# Patient Record
Sex: MALE | HISPANIC OR LATINO | Employment: OTHER | URBAN - METROPOLITAN AREA
[De-identification: names, ages, dates, MRNs, and addresses within clinical notes are randomized per-mention and may not be internally consistent; named-entity substitution may affect disease eponyms.]

---

## 2022-01-01 ENCOUNTER — MEDICAL CORRESPONDENCE (OUTPATIENT)
Dept: HEALTH INFORMATION MANAGEMENT | Facility: CLINIC | Age: 67
End: 2022-01-01

## 2022-01-01 ENCOUNTER — TRANSCRIBE ORDERS (OUTPATIENT)
Dept: OTHER | Age: 67
End: 2022-01-01

## 2022-01-01 ENCOUNTER — OFFICE VISIT (OUTPATIENT)
Dept: OPHTHALMOLOGY | Facility: CLINIC | Age: 67
End: 2022-01-01
Attending: OPHTHALMOLOGY
Payer: MEDICARE

## 2022-01-01 ENCOUNTER — NURSE TRIAGE (OUTPATIENT)
Dept: NURSING | Facility: CLINIC | Age: 67
End: 2022-01-01

## 2022-01-01 ENCOUNTER — TELEPHONE (OUTPATIENT)
Dept: OPHTHALMOLOGY | Facility: CLINIC | Age: 67
End: 2022-01-01

## 2022-01-01 DIAGNOSIS — H43.21 ASTEROID HYALOSIS OF RIGHT EYE: ICD-10-CM

## 2022-01-01 DIAGNOSIS — H02.886 MEIBOMIAN GLAND DYSFUNCTION (MGD) OF BOTH EYES: ICD-10-CM

## 2022-01-01 DIAGNOSIS — H04.123 DRY EYES, BILATERAL: ICD-10-CM

## 2022-01-01 DIAGNOSIS — H25.813 COMBINED FORMS OF AGE-RELATED CATARACT OF BOTH EYES: ICD-10-CM

## 2022-01-01 DIAGNOSIS — H02.883 MEIBOMIAN GLAND DYSFUNCTION (MGD) OF BOTH EYES: ICD-10-CM

## 2022-01-01 DIAGNOSIS — H53.8 BLURRY VISION, LEFT EYE: Primary | ICD-10-CM

## 2022-01-01 DIAGNOSIS — H40.003 GLAUCOMA SUSPECT OF BOTH EYES: Primary | ICD-10-CM

## 2022-01-01 PROCEDURE — 92133 CPTRZD OPH DX IMG PST SGM ON: CPT | Performed by: OPHTHALMOLOGY

## 2022-01-01 PROCEDURE — 92004 COMPRE OPH EXAM NEW PT 1/>: CPT | Mod: GC | Performed by: OPHTHALMOLOGY

## 2022-01-01 PROCEDURE — G0463 HOSPITAL OUTPT CLINIC VISIT: HCPCS

## 2022-01-01 RX ORDER — CARBOXYMETHYLCELLULOSE SODIUM 10 MG/ML
SOLUTION/ DROPS OPHTHALMIC
COMMUNITY
Start: 2022-01-01

## 2022-01-01 RX ORDER — TAMSULOSIN HYDROCHLORIDE 0.4 MG/1
0.4 CAPSULE ORAL DAILY
COMMUNITY
Start: 2022-01-01

## 2022-01-01 ASSESSMENT — REFRACTION_WEARINGRX
OD_SPHERE: +3.00
OS_CYLINDER: +1.00
OD_AXIS: 133
OD_ADD: +3.25
OS_SPHERE: +3.00
OS_ADD: +3.25
OS_AXIS: 058
OD_CYLINDER: +0.75

## 2022-01-01 ASSESSMENT — TONOMETRY
IOP_METHOD: TONOPEN
OD_IOP_MMHG: 18
OS_IOP_MMHG: 15

## 2022-01-01 ASSESSMENT — VISUAL ACUITY
OD_CC+: +2
OD_PH_CC: 20/25
OS_PH_CC: 20/25
OS_CC: 20/40
METHOD: SNELLEN - LINEAR
OD_PH_CC+: +2
OS_CC+: -2
OD_CC: 20/30
CORRECTION_TYPE: GLASSES
OS_PH_CC+: -1

## 2022-01-01 ASSESSMENT — CONF VISUAL FIELD
OS_SUPERIOR_NASAL_RESTRICTION: 0
METHOD: COUNTING FINGERS
OS_SUPERIOR_TEMPORAL_RESTRICTION: 0
OD_SUPERIOR_TEMPORAL_RESTRICTION: 0
OS_INFERIOR_NASAL_RESTRICTION: 0
OD_NORMAL: 1
OS_NORMAL: 1
OD_INFERIOR_TEMPORAL_RESTRICTION: 0
OD_INFERIOR_NASAL_RESTRICTION: 0
OD_SUPERIOR_NASAL_RESTRICTION: 0
OS_INFERIOR_TEMPORAL_RESTRICTION: 0

## 2022-01-01 ASSESSMENT — EXTERNAL EXAM - LEFT EYE: OS_EXAM: BROW PTOSIS

## 2022-01-01 ASSESSMENT — SLIT LAMP EXAM - LIDS
COMMENTS: DERMATOCHALASIS WITH LATERAL HOODING
COMMENTS: DERMATOCHALASIS

## 2022-01-01 ASSESSMENT — CUP TO DISC RATIO
OS_RATIO: .75
OD_RATIO: .75

## 2022-01-01 ASSESSMENT — EXTERNAL EXAM - RIGHT EYE: OD_EXAM: NORMAL

## 2022-12-06 NOTE — TELEPHONE ENCOUNTER
Spoke to pt with     Pt seen recently for eye pain on 12-4-2022 and spot in vision longer than 1 Month.    No acute vision changes    Foreign body sensation persist ant but improved since 12-4-2022    Scheduled tomorrow with Dr. Coffey at 0945    Pt aware of date/kyler/location/duration.    Juan Riddle RN 2:27 PM 12/06/22

## 2022-12-06 NOTE — TELEPHONE ENCOUNTER
Nurse Triage SBAR    Is this a 2nd Level Triage?    Yes    Situation: Left eye issue/Right eye has a black spot in the eye that has been there for years.       Background/Assessment:     Pt reporting, he went to a [place] eye clinic for a particle of something in his left eye.   Blurred vision in the left eye.    This provider gave him some wraps to help with the blurred vision.    But still feels like there is something stuck in his eye.    Also reporting, there is a black spot in the right eye.  But it has been there for years.    Pt wanting to be seen again in the eye clinic.    Called the back line to the clinic and transferred the call for further assistance.    Melissa Russ RN  Central Triage Red Flags/Med Refills         Protocol Recommended Disposition:   See in Office Today      Reason for Disposition    Patient wants to be seen    Additional Information    Negative: Doesn't sound like FB in the eye    Negative: Foreign body is a chemical    Negative: Foreign body (FB) hit eye at high speed (e.g., small metallic chip from hammering, lawnmower, BB gun, explosion)    Negative: Foreign body (FB) stuck on eyeball    Negative: Sharp FB (even if FB was removed) and any pain present now    Negative: Eye has been washed out > 30 minutes ago and still feels like FB is still present    Negative: Blurred vision that persists >1 hour after irrigation (regardless of duration of flushing)    Negative: Eye pain that persists > 1 hour after irrigation (regardless of duration of flushing)    Negative: Tearing or blinking that persists > 1 hour since irrigation (regardless of duration of flushing)    Negative: Cloudy spot on the cornea (clear part of the eye)    Negative: Sounds like a serious injury to the triager    Negative: SEVERE eye pain    Negative: Yellow or green pus occurs    Negative: Contact lens stuck in eye and unable to remove using CARE ADVICE    Protocols used: EYE - FOREIGN BODY-A-OH

## 2022-12-06 NOTE — TELEPHONE ENCOUNTER
Pt scheduled tomorrow with Dr. Coffey.    documented in other telephone encounter fro today    Juan Riddle RN 2:28 PM 12/06/22

## 2022-12-07 NOTE — PROGRESS NOTES
HPI    We are using the help of a  for this encounter.   Pt says that his vision is blurry.  There are dark spots in his RE.  Pt states that the floaters follow his vision. They are most noticeable in the mornings.   Pt has been using artificial tears. Pt states that the drops have been helping his eye.   Last week pt felt like there was something in his LE, but there is no pain currently.     MARCO ZUÑIGA COA December 7, 2022 10:15 AM     Last edited by MARCO ZUÑIGA on 12/7/2022 10:16 AM.         Review of systems for the eyes was negative other than the pertinent positives/negatives listed in the HPI.      Assessment & Plan    HPI:  Kristian HILL is a 67 year old male who reports 2 months of blurry vision in his left eye. He reports that 2 months ago he was sanding wood and felt something enter his left eye. Since then he has had persistent blurry vision.    He also reports floaters in the right eye for years that move with his eye.    POHx: bifocal use  PMHx:  History reviewed. No pertinent past medical history.    Current Medications: omeprazole (PRILOSEC) 20 MG DR capsule, TAKE 1 CAPSULE BY MOUTH EVERY DAY **DO NOT CRUSH**  ARTIFICIAL TEARS 1 % ophthalmic solution, APPLY 1 DROP TO EYE 4 (FOUR) TIMES DAILY AS NEEDED (FOR DRY EYES) FOR UP TO 90 DAYS  tamsulosin (FLOMAX) 0.4 MG capsule,     No current facility-administered medications on file prior to visit.    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 No treatment currently recommended. The patient will monitor for vision changes and contact us with any decrease in vision. Recheck in one year.      (H04.123) Dry eyes, bilateral  (H02.883,  H02.886) Meibomian gland dysfunction (MGD) of both eyes  Comment: Already using AT  Plan: Continue using AT, warm compresses as needed    Refraction error  Refraction recently updated at outside facility and patient is getting new glasses from them. Refraction deferred today per patient's request    Return in 2 months (on 2/7/2023) for HVF 24-2, applanation, pachymetry, and gonioscopy UNDILATED.        Thank you for entrusting us with your care  Kelli Arriaga MD, PGY2  Ophthalmology Resident  [facility]      Attending Physician Attestation:  Complete documentation of historical and exam elements from today's encounter can be found in the full encounter summary report (not reduplicated in this progress note).  I personally obtained the chief complaint(s) and history of present illness.  I confirmed and edited as necessary the review of systems, past medical/surgical history, family history, social history, and examination findings as documented by others; and I examined the patient myself.  I personally reviewed the relevant tests, images, and reports as documented above.  I formulated and edited as necessary the assessment and plan and discussed the findings and management plan with the patient and family. - Lawrence Coffey MD

## 2022-12-07 NOTE — NURSING NOTE
Chief Complaints and History of Present Illnesses   Patient presents with     Floaters     Chief Complaint(s) and History of Present Illness(es)     Floaters           Comments    We are using the help of a  for this encounter.   Pt says that his vision is blurry.  There are dark spots in his RE.  Pt states that the floaters follow his vision. They are most noticeable in the mornings.   Pt has been using artificial tears. Pt states that the drops have been helping his eye.   Last week pt felt like there was something in his LE, but there is no pain currently.     MARCO ZUÑIGA COA December 7, 2022 10:15 AM

## 2023-01-01 ENCOUNTER — TELEPHONE (OUTPATIENT)
Dept: OPHTHALMOLOGY | Facility: CLINIC | Age: 68
End: 2023-01-01

## 2023-01-01 ENCOUNTER — APPOINTMENT (OUTPATIENT)
Dept: INTERPRETER SERVICES | Facility: CLINIC | Age: 68
End: 2023-01-01
Payer: MEDICARE

## 2023-01-01 ENCOUNTER — OFFICE VISIT (OUTPATIENT)
Dept: SURGERY | Facility: CLINIC | Age: 68
End: 2023-01-01
Payer: MEDICARE

## 2023-01-01 ENCOUNTER — LAB REQUISITION (OUTPATIENT)
Dept: LAB | Facility: CLINIC | Age: 68
End: 2023-01-01
Payer: MEDICARE

## 2023-01-01 ENCOUNTER — TRANSCRIBE ORDERS (OUTPATIENT)
Dept: OTHER | Age: 68
End: 2023-01-01

## 2023-01-01 ENCOUNTER — HOSPITAL ENCOUNTER (OUTPATIENT)
Facility: AMBULATORY SURGERY CENTER | Age: 68
Discharge: HOME | End: 2023-11-02
Attending: OPHTHALMOLOGY
Payer: MEDICARE

## 2023-01-01 ENCOUNTER — ANESTHESIA EVENT (OUTPATIENT)
Dept: SURGERY | Facility: AMBULATORY SURGERY CENTER | Age: 68
End: 2023-01-01
Payer: MEDICARE

## 2023-01-01 ENCOUNTER — PATIENT OUTREACH (OUTPATIENT)
Dept: DERMATOLOGY | Facility: CLINIC | Age: 68
End: 2023-01-01
Payer: MEDICARE

## 2023-01-01 ENCOUNTER — LAB (OUTPATIENT)
Dept: LAB | Facility: CLINIC | Age: 68
End: 2023-01-01
Attending: INTERNAL MEDICINE
Payer: MEDICARE

## 2023-01-01 ENCOUNTER — HOSPITAL ENCOUNTER (OUTPATIENT)
Facility: CLINIC | Age: 68
Discharge: HOME | End: 2023-06-19
Attending: INTERNAL MEDICINE | Admitting: INTERNAL MEDICINE
Payer: MEDICARE

## 2023-01-01 ENCOUNTER — OFFICE VISIT (OUTPATIENT)
Dept: OPHTHALMOLOGY | Facility: CLINIC | Age: 68
End: 2023-01-01
Attending: OPHTHALMOLOGY
Payer: MEDICARE

## 2023-01-01 ENCOUNTER — TELEPHONE (OUTPATIENT)
Dept: GASTROENTEROLOGY | Facility: CLINIC | Age: 68
End: 2023-01-01
Payer: MEDICARE

## 2023-01-01 ENCOUNTER — OFFICE VISIT (OUTPATIENT)
Dept: OPHTHALMOLOGY | Facility: CLINIC | Age: 68
End: 2023-01-01
Payer: MEDICARE

## 2023-01-01 ENCOUNTER — PRE VISIT (OUTPATIENT)
Dept: SURGERY | Facility: CLINIC | Age: 68
End: 2023-01-01

## 2023-01-01 ENCOUNTER — ANESTHESIA (OUTPATIENT)
Dept: SURGERY | Facility: AMBULATORY SURGERY CENTER | Age: 68
End: 2023-01-01
Payer: MEDICARE

## 2023-01-01 ENCOUNTER — HOSPITAL ENCOUNTER (OUTPATIENT)
Facility: AMBULATORY SURGERY CENTER | Age: 68
Discharge: HOME | End: 2023-08-31
Attending: OPHTHALMOLOGY
Payer: MEDICARE

## 2023-01-01 ENCOUNTER — TRANSFERRED RECORDS (OUTPATIENT)
Dept: HEALTH INFORMATION MANAGEMENT | Facility: CLINIC | Age: 68
End: 2023-01-01

## 2023-01-01 ENCOUNTER — ANCILLARY PROCEDURE (OUTPATIENT)
Dept: ULTRASOUND IMAGING | Facility: CLINIC | Age: 68
End: 2023-01-01
Attending: INTERNAL MEDICINE
Payer: MEDICARE

## 2023-01-01 ENCOUNTER — TELEPHONE (OUTPATIENT)
Dept: DERMATOLOGY | Facility: CLINIC | Age: 68
End: 2023-01-01

## 2023-01-01 ENCOUNTER — MEDICAL CORRESPONDENCE (OUTPATIENT)
Dept: HEALTH INFORMATION MANAGEMENT | Facility: CLINIC | Age: 68
End: 2023-01-01
Payer: MEDICARE

## 2023-01-01 ENCOUNTER — MEDICAL CORRESPONDENCE (OUTPATIENT)
Dept: HEALTH INFORMATION MANAGEMENT | Facility: CLINIC | Age: 68
End: 2023-01-01

## 2023-01-01 ENCOUNTER — OFFICE VISIT (OUTPATIENT)
Dept: DERMATOLOGY | Facility: CLINIC | Age: 68
End: 2023-01-01
Attending: INTERNAL MEDICINE
Payer: MEDICARE

## 2023-01-01 ENCOUNTER — TELEPHONE (OUTPATIENT)
Dept: OPHTHALMOLOGY | Facility: CLINIC | Age: 68
End: 2023-01-01
Payer: MEDICARE

## 2023-01-01 ENCOUNTER — OFFICE VISIT (OUTPATIENT)
Dept: OPHTHALMOLOGY | Facility: CLINIC | Age: 68
End: 2023-01-01

## 2023-01-01 ENCOUNTER — TELEPHONE (OUTPATIENT)
Dept: GASTROENTEROLOGY | Facility: CLINIC | Age: 68
End: 2023-01-01

## 2023-01-01 ENCOUNTER — PATIENT OUTREACH (OUTPATIENT)
Dept: CARE COORDINATION | Facility: CLINIC | Age: 68
End: 2023-01-01
Payer: MEDICARE

## 2023-01-01 ENCOUNTER — PATIENT OUTREACH (OUTPATIENT)
Dept: CARE COORDINATION | Facility: CLINIC | Age: 68
End: 2023-01-01

## 2023-01-01 ENCOUNTER — OFFICE VISIT (OUTPATIENT)
Dept: GASTROENTEROLOGY | Facility: CLINIC | Age: 68
End: 2023-01-01
Attending: INTERNAL MEDICINE
Payer: MEDICARE

## 2023-01-01 VITALS
HEART RATE: 56 BPM | DIASTOLIC BLOOD PRESSURE: 83 MMHG | SYSTOLIC BLOOD PRESSURE: 126 MMHG | RESPIRATION RATE: 14 BRPM | OXYGEN SATURATION: 98 %

## 2023-01-01 VITALS
BODY MASS INDEX: 32.78 KG/M2 | OXYGEN SATURATION: 97 % | DIASTOLIC BLOOD PRESSURE: 71 MMHG | HEART RATE: 56 BPM | TEMPERATURE: 98.4 F | HEIGHT: 63 IN | SYSTOLIC BLOOD PRESSURE: 132 MMHG | WEIGHT: 185 LBS

## 2023-01-01 VITALS
DIASTOLIC BLOOD PRESSURE: 64 MMHG | WEIGHT: 180 LBS | BODY MASS INDEX: 31.89 KG/M2 | RESPIRATION RATE: 14 BRPM | HEIGHT: 63 IN | TEMPERATURE: 96.9 F | SYSTOLIC BLOOD PRESSURE: 113 MMHG | OXYGEN SATURATION: 99 % | HEART RATE: 51 BPM

## 2023-01-01 VITALS
DIASTOLIC BLOOD PRESSURE: 82 MMHG | BODY MASS INDEX: 31.89 KG/M2 | OXYGEN SATURATION: 99 % | HEIGHT: 63 IN | TEMPERATURE: 97.2 F | WEIGHT: 180 LBS | RESPIRATION RATE: 20 BRPM | SYSTOLIC BLOOD PRESSURE: 132 MMHG

## 2023-01-01 VITALS
WEIGHT: 189.5 LBS | HEART RATE: 71 BPM | DIASTOLIC BLOOD PRESSURE: 87 MMHG | HEIGHT: 63 IN | SYSTOLIC BLOOD PRESSURE: 146 MMHG | OXYGEN SATURATION: 99 % | BODY MASS INDEX: 33.58 KG/M2

## 2023-01-01 DIAGNOSIS — L21.9 DERMATITIS, SEBORRHEIC: Primary | ICD-10-CM

## 2023-01-01 DIAGNOSIS — H25.813 COMBINED FORMS OF AGE-RELATED CATARACT OF BOTH EYES: ICD-10-CM

## 2023-01-01 DIAGNOSIS — B18.2 CHRONIC HEPATITIS C WITHOUT HEPATIC COMA (H): Primary | ICD-10-CM

## 2023-01-01 DIAGNOSIS — H54.7 UNEXPLAINED VISUAL LOSS: Primary | ICD-10-CM

## 2023-01-01 DIAGNOSIS — L29.9 PRURITUS: ICD-10-CM

## 2023-01-01 DIAGNOSIS — Z96.1 PSEUDOPHAKIA, LEFT EYE: Primary | ICD-10-CM

## 2023-01-01 DIAGNOSIS — H52.03 HYPEROPIA OF BOTH EYES WITH ASTIGMATISM AND PRESBYOPIA: ICD-10-CM

## 2023-01-01 DIAGNOSIS — H25.811 COMBINED FORMS OF AGE-RELATED CATARACT OF RIGHT EYE: Primary | ICD-10-CM

## 2023-01-01 DIAGNOSIS — Z01.818 PRE-OP EVALUATION: Primary | ICD-10-CM

## 2023-01-01 DIAGNOSIS — R10.13 EPIGASTRIC PAIN: ICD-10-CM

## 2023-01-01 DIAGNOSIS — L98.9 SCALP LESION: ICD-10-CM

## 2023-01-01 DIAGNOSIS — Z00.00 ENCOUNTER FOR GENERAL ADULT MEDICAL EXAMINATION WITHOUT ABNORMAL FINDINGS: ICD-10-CM

## 2023-01-01 DIAGNOSIS — Z86.0100 HISTORY OF COLONIC POLYPS: Primary | ICD-10-CM

## 2023-01-01 DIAGNOSIS — Z71.89 COUNSELING AND COORDINATION OF CARE: Primary | ICD-10-CM

## 2023-01-01 DIAGNOSIS — B18.2 CHRONIC VIRAL HEPATITIS C (H): ICD-10-CM

## 2023-01-01 DIAGNOSIS — B18.2 CHRONIC HEPATITIS C WITHOUT HEPATIC COMA (H): ICD-10-CM

## 2023-01-01 DIAGNOSIS — D48.5 NEOPLASM OF UNCERTAIN BEHAVIOR OF SKIN: ICD-10-CM

## 2023-01-01 DIAGNOSIS — R63.8 OTHER SYMPTOMS AND SIGNS CONCERNING FOOD AND FLUID INTAKE: ICD-10-CM

## 2023-01-01 DIAGNOSIS — Z98.890 POSTOPERATIVE EYE STATE: Primary | ICD-10-CM

## 2023-01-01 DIAGNOSIS — H40.003 GLAUCOMA SUSPECT OF BOTH EYES: Primary | ICD-10-CM

## 2023-01-01 DIAGNOSIS — H40.003 GLAUCOMA SUSPECT OF BOTH EYES: ICD-10-CM

## 2023-01-01 DIAGNOSIS — Z12.11 SCREENING FOR COLON CANCER: Primary | ICD-10-CM

## 2023-01-01 DIAGNOSIS — Z96.1 PSEUDOPHAKIA, LEFT EYE: ICD-10-CM

## 2023-01-01 DIAGNOSIS — R39.9 UNSPECIFIED SYMPTOMS AND SIGNS INVOLVING THE GENITOURINARY SYSTEM: ICD-10-CM

## 2023-01-01 DIAGNOSIS — Z96.1 PSEUDOPHAKIA OF BOTH EYES: Primary | ICD-10-CM

## 2023-01-01 DIAGNOSIS — R74.8 ELEVATED LIVER ENZYMES: ICD-10-CM

## 2023-01-01 DIAGNOSIS — H25.812 COMBINED FORM OF AGE-RELATED CATARACT, LEFT EYE: Primary | ICD-10-CM

## 2023-01-01 DIAGNOSIS — Z96.1 PSEUDOPHAKIA OF RIGHT EYE: ICD-10-CM

## 2023-01-01 DIAGNOSIS — I10 ESSENTIAL (PRIMARY) HYPERTENSION: ICD-10-CM

## 2023-01-01 DIAGNOSIS — H52.4 HYPEROPIA OF BOTH EYES WITH ASTIGMATISM AND PRESBYOPIA: ICD-10-CM

## 2023-01-01 DIAGNOSIS — L98.9 SCALP LESION: Primary | ICD-10-CM

## 2023-01-01 DIAGNOSIS — N40.1 BENIGN PROSTATIC HYPERPLASIA WITH LOWER URINARY TRACT SYMPTOMS: ICD-10-CM

## 2023-01-01 DIAGNOSIS — H52.203 HYPEROPIA OF BOTH EYES WITH ASTIGMATISM AND PRESBYOPIA: ICD-10-CM

## 2023-01-01 LAB
ALBUMIN SERPL BCG-MCNC: 4.3 G/DL (ref 3.5–5.2)
ALBUMIN SERPL BCG-MCNC: 4.3 G/DL (ref 3.5–5.2)
ALP SERPL-CCNC: 61 U/L (ref 40–150)
ALP SERPL-CCNC: 80 U/L (ref 40–129)
ALT SERPL W P-5'-P-CCNC: 121 U/L (ref 10–50)
ALT SERPL W P-5'-P-CCNC: 86 U/L (ref 0–70)
ANION GAP SERPL CALCULATED.3IONS-SCNC: 12 MMOL/L (ref 7–15)
ANION GAP SERPL CALCULATED.3IONS-SCNC: 15 MMOL/L (ref 7–15)
ANION GAP SERPL CALCULATED.3IONS-SCNC: 18 MMOL/L (ref 7–15)
AST SERPL W P-5'-P-CCNC: 51 U/L (ref 0–45)
AST SERPL W P-5'-P-CCNC: 66 U/L (ref 10–50)
BILIRUB SERPL-MCNC: 0.7 MG/DL
BILIRUB SERPL-MCNC: 0.9 MG/DL
BUN SERPL-MCNC: 11.3 MG/DL (ref 8–23)
BUN SERPL-MCNC: 15.4 MG/DL (ref 8–23)
BUN SERPL-MCNC: 17.3 MG/DL (ref 8–23)
CALCIUM SERPL-MCNC: 9 MG/DL (ref 8.8–10.2)
CALCIUM SERPL-MCNC: 9.5 MG/DL (ref 8.8–10.2)
CALCIUM SERPL-MCNC: 9.7 MG/DL (ref 8.8–10.2)
CHLORIDE SERPL-SCNC: 102 MMOL/L (ref 98–107)
CHLORIDE SERPL-SCNC: 103 MMOL/L (ref 98–107)
CHLORIDE SERPL-SCNC: 108 MMOL/L (ref 98–107)
CHOLEST SERPL-MCNC: 157 MG/DL
COLONOSCOPY: NORMAL
CREAT SERPL-MCNC: 0.73 MG/DL (ref 0.67–1.17)
CREAT SERPL-MCNC: 0.82 MG/DL (ref 0.67–1.17)
CREAT SERPL-MCNC: 0.83 MG/DL (ref 0.67–1.17)
DEPRECATED HCO3 PLAS-SCNC: 20 MMOL/L (ref 22–29)
DEPRECATED HCO3 PLAS-SCNC: 22 MMOL/L (ref 22–29)
DEPRECATED HCO3 PLAS-SCNC: 23 MMOL/L (ref 22–29)
EGFRCR SERPLBLD CKD-EPI 2021: >90 ML/MIN/1.73M2
ERYTHROCYTE [DISTWIDTH] IN BLOOD BY AUTOMATED COUNT: 12.3 % (ref 10–15)
GFR SERPL CREATININE-BSD FRML MDRD: >90 ML/MIN/1.73M2
GFR SERPL CREATININE-BSD FRML MDRD: >90 ML/MIN/1.73M2
GLUCOSE SERPL-MCNC: 100 MG/DL (ref 70–99)
GLUCOSE SERPL-MCNC: 101 MG/DL (ref 70–99)
GLUCOSE SERPL-MCNC: 90 MG/DL (ref 70–99)
H PYLORI AG STL QL IA: NEGATIVE
HBV CORE AB SERPL QL IA: NONREACTIVE
HBV SURFACE AB SERPL IA-ACNC: 1.25 M[IU]/ML
HBV SURFACE AB SERPL IA-ACNC: NONREACTIVE M[IU]/ML
HBV SURFACE AG SERPL QL IA: NONREACTIVE
HCT VFR BLD AUTO: 49.8 % (ref 40–53)
HCV AB SERPL QL IA: REACTIVE
HCV GENTYP SERPL NAA+PROBE: NORMAL
HCV RNA SERPL NAA+PROBE-ACNC: ABNORMAL IU/ML
HCV RNA SERPL NAA+PROBE-ACNC: ABNORMAL IU/ML
HCV RNA SERPL NAA+PROBE-LOG IU: 4.8 {LOG_IU}/ML
HCV RNA SERPL NAA+PROBE-LOG IU: 5.8 {LOG_IU}/ML
HDLC SERPL-MCNC: 46 MG/DL
HGB BLD-MCNC: 16.9 G/DL (ref 13.3–17.7)
INR PPP: 1.01 (ref 0.85–1.15)
LDLC SERPL CALC-MCNC: 84 MG/DL
MCH RBC QN AUTO: 29.9 PG (ref 26.5–33)
MCHC RBC AUTO-ENTMCNC: 33.9 G/DL (ref 31.5–36.5)
MCV RBC AUTO: 88 FL (ref 78–100)
NONHDLC SERPL-MCNC: 111 MG/DL
PATH REPORT.COMMENTS IMP SPEC: NORMAL
PATH REPORT.FINAL DX SPEC: NORMAL
PATH REPORT.FINAL DX SPEC: NORMAL
PATH REPORT.GROSS SPEC: NORMAL
PATH REPORT.GROSS SPEC: NORMAL
PATH REPORT.MICROSCOPIC SPEC OTHER STN: NORMAL
PATH REPORT.MICROSCOPIC SPEC OTHER STN: NORMAL
PATH REPORT.RELEVANT HX SPEC: NORMAL
PATH REPORT.RELEVANT HX SPEC: NORMAL
PHOTO IMAGE: NORMAL
PLATELET # BLD AUTO: 164 10E3/UL (ref 150–450)
POTASSIUM SERPL-SCNC: 3.4 MMOL/L (ref 3.4–5.3)
POTASSIUM SERPL-SCNC: 3.8 MMOL/L (ref 3.4–5.3)
POTASSIUM SERPL-SCNC: 3.8 MMOL/L (ref 3.4–5.3)
PROT SERPL-MCNC: 8.2 G/DL (ref 6.4–8.3)
PROT SERPL-MCNC: 8.4 G/DL (ref 6.4–8.3)
PSA SERPL-MCNC: 3.49 NG/ML (ref 0–4.5)
RBC # BLD AUTO: 5.66 10E6/UL (ref 4.4–5.9)
SODIUM SERPL-SCNC: 140 MMOL/L (ref 136–145)
SODIUM SERPL-SCNC: 140 MMOL/L (ref 136–145)
SODIUM SERPL-SCNC: 143 MMOL/L (ref 135–145)
TRIGL SERPL-MCNC: 137 MG/DL
TSH SERPL DL<=0.005 MIU/L-ACNC: 3.3 UIU/ML (ref 0.3–4.2)
WBC # BLD AUTO: 4.3 10E3/UL (ref 4–11)

## 2023-01-01 PROCEDURE — 92083 EXTENDED VISUAL FIELD XM: CPT | Performed by: OPHTHALMOLOGY

## 2023-01-01 PROCEDURE — G0463 HOSPITAL OUTPT CLINIC VISIT: HCPCS | Performed by: OPHTHALMOLOGY

## 2023-01-01 PROCEDURE — 99203 OFFICE O/P NEW LOW 30 MIN: CPT | Mod: 25 | Performed by: STUDENT IN AN ORGANIZED HEALTH CARE EDUCATION/TRAINING PROGRAM

## 2023-01-01 PROCEDURE — 80048 BASIC METABOLIC PNL TOTAL CA: CPT | Mod: ORL | Performed by: INTERNAL MEDICINE

## 2023-01-01 PROCEDURE — 99024 POSTOP FOLLOW-UP VISIT: CPT | Mod: GC | Performed by: OPHTHALMOLOGY

## 2023-01-01 PROCEDURE — 99214 OFFICE O/P EST MOD 30 MIN: CPT | Performed by: OPHTHALMOLOGY

## 2023-01-01 PROCEDURE — 99204 OFFICE O/P NEW MOD 45 MIN: CPT | Performed by: INTERNAL MEDICINE

## 2023-01-01 PROCEDURE — 66984 XCAPSL CTRC RMVL W/O ECP: CPT | Mod: 79 | Performed by: OPHTHALMOLOGY

## 2023-01-01 PROCEDURE — 84443 ASSAY THYROID STIM HORMONE: CPT | Mod: ORL | Performed by: INTERNAL MEDICINE

## 2023-01-01 PROCEDURE — 88305 TISSUE EXAM BY PATHOLOGIST: CPT | Mod: TC | Performed by: STUDENT IN AN ORGANIZED HEALTH CARE EDUCATION/TRAINING PROGRAM

## 2023-01-01 PROCEDURE — 87338 HPYLORI STOOL AG IA: CPT | Mod: ORL | Performed by: INTERNAL MEDICINE

## 2023-01-01 PROCEDURE — 999N000099 HC STATISTIC MODERATE SEDATION < 10 MIN: Performed by: INTERNAL MEDICINE

## 2023-01-01 PROCEDURE — G0103 PSA SCREENING: HCPCS | Mod: ORL | Performed by: INTERNAL MEDICINE

## 2023-01-01 PROCEDURE — 87340 HEPATITIS B SURFACE AG IA: CPT | Mod: ORL | Performed by: INTERNAL MEDICINE

## 2023-01-01 PROCEDURE — 99024 POSTOP FOLLOW-UP VISIT: CPT | Performed by: OPHTHALMOLOGY

## 2023-01-01 PROCEDURE — 11102 TANGNTL BX SKIN SINGLE LES: CPT | Mod: GC | Performed by: STUDENT IN AN ORGANIZED HEALTH CARE EDUCATION/TRAINING PROGRAM

## 2023-01-01 PROCEDURE — 87522 HEPATITIS C REVRS TRNSCRPJ: CPT | Mod: ORL | Performed by: INTERNAL MEDICINE

## 2023-01-01 PROCEDURE — 85610 PROTHROMBIN TIME: CPT | Mod: ORL | Performed by: INTERNAL MEDICINE

## 2023-01-01 PROCEDURE — 88305 TISSUE EXAM BY PATHOLOGIST: CPT | Mod: 26 | Performed by: STUDENT IN AN ORGANIZED HEALTH CARE EDUCATION/TRAINING PROGRAM

## 2023-01-01 PROCEDURE — 76516 ECHO EXAM OF EYE: CPT | Performed by: OPHTHALMOLOGY

## 2023-01-01 PROCEDURE — G0500 MOD SEDAT ENDO SERVICE >5YRS: HCPCS | Performed by: INTERNAL MEDICINE

## 2023-01-01 PROCEDURE — 45385 COLONOSCOPY W/LESION REMOVAL: CPT | Mod: PT | Performed by: INTERNAL MEDICINE

## 2023-01-01 PROCEDURE — 76700 US EXAM ABDOM COMPLETE: CPT | Performed by: STUDENT IN AN ORGANIZED HEALTH CARE EDUCATION/TRAINING PROGRAM

## 2023-01-01 PROCEDURE — 85027 COMPLETE CBC AUTOMATED: CPT | Performed by: PATHOLOGY

## 2023-01-01 PROCEDURE — 86706 HEP B SURFACE ANTIBODY: CPT | Mod: ORL | Performed by: INTERNAL MEDICINE

## 2023-01-01 PROCEDURE — 86803 HEPATITIS C AB TEST: CPT | Mod: ORL | Performed by: INTERNAL MEDICINE

## 2023-01-01 PROCEDURE — 88305 TISSUE EXAM BY PATHOLOGIST: CPT | Mod: 26 | Performed by: DERMATOLOGY

## 2023-01-01 PROCEDURE — 99203 OFFICE O/P NEW LOW 30 MIN: CPT | Performed by: PHYSICIAN ASSISTANT

## 2023-01-01 PROCEDURE — 80061 LIPID PANEL: CPT | Mod: ORL | Performed by: INTERNAL MEDICINE

## 2023-01-01 PROCEDURE — 87902 NFCT AGT GNTYP ALYS HEP C: CPT | Mod: 90 | Performed by: PATHOLOGY

## 2023-01-01 PROCEDURE — 92015 DETERMINE REFRACTIVE STATE: CPT | Mod: GY

## 2023-01-01 PROCEDURE — 99213 OFFICE O/P EST LOW 20 MIN: CPT | Performed by: OPHTHALMOLOGY

## 2023-01-01 PROCEDURE — 250N000011 HC RX IP 250 OP 636: Performed by: INTERNAL MEDICINE

## 2023-01-01 PROCEDURE — 80053 COMPREHEN METABOLIC PANEL: CPT | Mod: ORL | Performed by: INTERNAL MEDICINE

## 2023-01-01 PROCEDURE — 92133 CPTRZD OPH DX IMG PST SGM ON: CPT | Performed by: OPHTHALMOLOGY

## 2023-01-01 PROCEDURE — 87902 NFCT AGT GNTYP ALYS HEP C: CPT | Mod: ORL | Performed by: INTERNAL MEDICINE

## 2023-01-01 PROCEDURE — 88305 TISSUE EXAM BY PATHOLOGIST: CPT | Mod: TC | Performed by: INTERNAL MEDICINE

## 2023-01-01 PROCEDURE — 86704 HEP B CORE ANTIBODY TOTAL: CPT | Mod: ORL | Performed by: INTERNAL MEDICINE

## 2023-01-01 PROCEDURE — 36415 COLL VENOUS BLD VENIPUNCTURE: CPT | Performed by: PATHOLOGY

## 2023-01-01 PROCEDURE — 66984 XCAPSL CTRC RMVL W/O ECP: CPT | Mod: RT

## 2023-01-01 PROCEDURE — 66984 XCAPSL CTRC RMVL W/O ECP: CPT | Mod: LT

## 2023-01-01 PROCEDURE — 99000 SPECIMEN HANDLING OFFICE-LAB: CPT | Performed by: PATHOLOGY

## 2023-01-01 PROCEDURE — G0463 HOSPITAL OUTPT CLINIC VISIT: HCPCS

## 2023-01-01 DEVICE — LENS CC60WF 24.0 CLAREON UV ASPHERIC BICONVEX IOL: Type: IMPLANTABLE DEVICE | Site: EYE | Status: FUNCTIONAL

## 2023-01-01 DEVICE — LENS CC60WF 25.0 CLAREON UV ASPHERIC BICONVEX IOL: Type: IMPLANTABLE DEVICE | Site: EYE | Status: FUNCTIONAL

## 2023-01-01 RX ORDER — MOXIFLOXACIN 5 MG/ML
1 SOLUTION/ DROPS OPHTHALMIC 4 TIMES DAILY
Qty: 3 ML | Refills: 0 | Status: SHIPPED | OUTPATIENT
Start: 2023-01-01

## 2023-01-01 RX ORDER — FLUOCINOLONE ACETONIDE 0.11 MG/ML
OIL TOPICAL
Qty: 118.28 ML | Refills: 1 | Status: SHIPPED | OUTPATIENT
Start: 2023-01-01

## 2023-01-01 RX ORDER — PREDNISOLONE ACETATE 10 MG/ML
1-2 SUSPENSION/ DROPS OPHTHALMIC 4 TIMES DAILY
Qty: 10 ML | Refills: 0 | Status: SHIPPED | OUTPATIENT
Start: 2023-01-01

## 2023-01-01 RX ORDER — PROPARACAINE HYDROCHLORIDE 5 MG/ML
1 SOLUTION/ DROPS OPHTHALMIC
Status: DISCONTINUED | OUTPATIENT
Start: 2023-01-01 | End: 2023-01-01 | Stop reason: HOSPADM

## 2023-01-01 RX ORDER — LIDOCAINE 40 MG/G
CREAM TOPICAL
Status: CANCELLED | OUTPATIENT
Start: 2023-01-01

## 2023-01-01 RX ORDER — KETOCONAZOLE 20 MG/ML
SHAMPOO TOPICAL
Qty: 120 ML | Refills: 11 | Status: SHIPPED | OUTPATIENT
Start: 2023-01-01

## 2023-01-01 RX ORDER — LIDOCAINE 40 MG/G
CREAM TOPICAL
Status: DISCONTINUED | OUTPATIENT
Start: 2023-01-01 | End: 2023-01-01 | Stop reason: HOSPADM

## 2023-01-01 RX ORDER — KETOROLAC TROMETHAMINE 5 MG/ML
1 SOLUTION OPHTHALMIC 4 TIMES DAILY
Qty: 10 ML | Refills: 0 | Status: SHIPPED | OUTPATIENT
Start: 2023-01-01

## 2023-01-01 RX ORDER — MOXIFLOXACIN 5 MG/ML
1 SOLUTION/ DROPS OPHTHALMIC
Status: COMPLETED | OUTPATIENT
Start: 2023-01-01 | End: 2023-01-01

## 2023-01-01 RX ORDER — ACETAMINOPHEN 325 MG/1
975 TABLET ORAL ONCE
Status: DISCONTINUED | OUTPATIENT
Start: 2023-01-01 | End: 2023-01-01 | Stop reason: HOSPADM

## 2023-01-01 RX ORDER — SODIUM CHLORIDE, SODIUM LACTATE, POTASSIUM CHLORIDE, CALCIUM CHLORIDE 600; 310; 30; 20 MG/100ML; MG/100ML; MG/100ML; MG/100ML
INJECTION, SOLUTION INTRAVENOUS CONTINUOUS
Status: DISCONTINUED | OUTPATIENT
Start: 2023-01-01 | End: 2023-01-01 | Stop reason: HOSPADM

## 2023-01-01 RX ORDER — ACETAMINOPHEN 325 MG/1
975 TABLET ORAL ONCE
Status: COMPLETED | OUTPATIENT
Start: 2023-01-01 | End: 2023-01-01

## 2023-01-01 RX ORDER — ONDANSETRON 4 MG/1
4 TABLET, ORALLY DISINTEGRATING ORAL EVERY 30 MIN PRN
Status: CANCELLED | OUTPATIENT
Start: 2023-01-01

## 2023-01-01 RX ORDER — PROPARACAINE HYDROCHLORIDE 5 MG/ML
1 SOLUTION/ DROPS OPHTHALMIC ONCE
Status: COMPLETED | OUTPATIENT
Start: 2023-01-01 | End: 2023-01-01

## 2023-01-01 RX ORDER — OXYCODONE HYDROCHLORIDE 5 MG/1
10 TABLET ORAL
Status: CANCELLED | OUTPATIENT
Start: 2023-01-01

## 2023-01-01 RX ORDER — PROPOFOL 10 MG/ML
INJECTION, EMULSION INTRAVENOUS PRN
Status: DISCONTINUED | OUTPATIENT
Start: 2023-01-01 | End: 2023-01-01

## 2023-01-01 RX ORDER — BALANCED SALT SOLUTION 6.4; .75; .48; .3; 3.9; 1.7 MG/ML; MG/ML; MG/ML; MG/ML; MG/ML; MG/ML
SOLUTION OPHTHALMIC PRN
Status: DISCONTINUED | OUTPATIENT
Start: 2023-01-01 | End: 2023-01-01 | Stop reason: HOSPADM

## 2023-01-01 RX ORDER — DICLOFENAC SODIUM 1 MG/ML
1 SOLUTION/ DROPS OPHTHALMIC
Status: COMPLETED | OUTPATIENT
Start: 2023-01-01 | End: 2023-01-01

## 2023-01-01 RX ORDER — DICLOFENAC SODIUM 1 MG/ML
1 SOLUTION/ DROPS OPHTHALMIC
Status: DISCONTINUED | OUTPATIENT
Start: 2023-01-01 | End: 2023-01-01 | Stop reason: HOSPADM

## 2023-01-01 RX ORDER — DEXAMETHASONE SODIUM PHOSPHATE 4 MG/ML
INJECTION, SOLUTION INTRA-ARTICULAR; INTRALESIONAL; INTRAMUSCULAR; INTRAVENOUS; SOFT TISSUE PRN
Status: DISCONTINUED | OUTPATIENT
Start: 2023-01-01 | End: 2023-01-01 | Stop reason: HOSPADM

## 2023-01-01 RX ORDER — OXYCODONE HYDROCHLORIDE 5 MG/1
10 TABLET ORAL
Status: DISCONTINUED | OUTPATIENT
Start: 2023-01-01 | End: 2023-01-01 | Stop reason: HOSPADM

## 2023-01-01 RX ORDER — LIDOCAINE HYDROCHLORIDE 20 MG/ML
INJECTION, SOLUTION INFILTRATION; PERINEURAL PRN
Status: DISCONTINUED | OUTPATIENT
Start: 2023-01-01 | End: 2023-01-01

## 2023-01-01 RX ORDER — CYCLOPENTOLAT/TROPIC/PHENYLEPH 1%-1%-2.5%
1 DROPS (EA) OPHTHALMIC (EYE)
Status: COMPLETED | OUTPATIENT
Start: 2023-01-01 | End: 2023-01-01

## 2023-01-01 RX ORDER — OXYCODONE HYDROCHLORIDE 5 MG/1
5 TABLET ORAL
Status: DISCONTINUED | OUTPATIENT
Start: 2023-01-01 | End: 2023-01-01 | Stop reason: HOSPADM

## 2023-01-01 RX ORDER — MOXIFLOXACIN IN NACL,ISO-OS/PF 0.3MG/0.3
SYRINGE (ML) INTRAOCULAR PRN
Status: DISCONTINUED | OUTPATIENT
Start: 2023-01-01 | End: 2023-01-01 | Stop reason: HOSPADM

## 2023-01-01 RX ORDER — ONDANSETRON 2 MG/ML
4 INJECTION INTRAMUSCULAR; INTRAVENOUS EVERY 30 MIN PRN
Status: DISCONTINUED | OUTPATIENT
Start: 2023-01-01 | End: 2023-01-01 | Stop reason: HOSPADM

## 2023-01-01 RX ORDER — ACETAMINOPHEN 325 MG/1
975 TABLET ORAL ONCE
Status: CANCELLED | OUTPATIENT
Start: 2023-01-01 | End: 2023-01-01

## 2023-01-01 RX ORDER — ONDANSETRON 2 MG/ML
4 INJECTION INTRAMUSCULAR; INTRAVENOUS EVERY 30 MIN PRN
Status: CANCELLED | OUTPATIENT
Start: 2023-01-01

## 2023-01-01 RX ORDER — PREDNISOLONE ACETATE 10 MG/ML
1-2 SUSPENSION/ DROPS OPHTHALMIC 4 TIMES DAILY
Qty: 10 ML | Refills: 0 | Status: SHIPPED | OUTPATIENT
Start: 2023-01-01 | End: 2023-01-01

## 2023-01-01 RX ORDER — KETOROLAC TROMETHAMINE 5 MG/ML
1 SOLUTION OPHTHALMIC 4 TIMES DAILY
Qty: 10 ML | Refills: 0 | Status: SHIPPED | OUTPATIENT
Start: 2023-01-01 | End: 2023-01-01

## 2023-01-01 RX ORDER — ONDANSETRON 4 MG/1
4 TABLET, ORALLY DISINTEGRATING ORAL EVERY 30 MIN PRN
Status: DISCONTINUED | OUTPATIENT
Start: 2023-01-01 | End: 2023-01-01 | Stop reason: HOSPADM

## 2023-01-01 RX ORDER — FENTANYL CITRATE 50 UG/ML
INJECTION, SOLUTION INTRAMUSCULAR; INTRAVENOUS PRN
Status: DISCONTINUED | OUTPATIENT
Start: 2023-01-01 | End: 2023-01-01 | Stop reason: HOSPADM

## 2023-01-01 RX ORDER — MOXIFLOXACIN 5 MG/ML
1 SOLUTION/ DROPS OPHTHALMIC 4 TIMES DAILY
Qty: 3 ML | Refills: 0 | Status: SHIPPED | OUTPATIENT
Start: 2023-01-01 | End: 2023-01-01

## 2023-01-01 RX ORDER — OXYCODONE HYDROCHLORIDE 5 MG/1
5 TABLET ORAL
Status: CANCELLED | OUTPATIENT
Start: 2023-01-01

## 2023-01-01 RX ORDER — FENTANYL CITRATE 50 UG/ML
50 INJECTION, SOLUTION INTRAMUSCULAR; INTRAVENOUS
Status: DISCONTINUED | OUTPATIENT
Start: 2023-01-01 | End: 2023-01-01 | Stop reason: HOSPADM

## 2023-01-01 RX ORDER — SODIUM CHLORIDE, SODIUM LACTATE, POTASSIUM CHLORIDE, CALCIUM CHLORIDE 600; 310; 30; 20 MG/100ML; MG/100ML; MG/100ML; MG/100ML
INJECTION, SOLUTION INTRAVENOUS CONTINUOUS
Status: CANCELLED | OUTPATIENT
Start: 2023-01-01

## 2023-01-01 RX ORDER — TETRACAINE HYDROCHLORIDE 5 MG/ML
SOLUTION OPHTHALMIC PRN
Status: DISCONTINUED | OUTPATIENT
Start: 2023-01-01 | End: 2023-01-01 | Stop reason: HOSPADM

## 2023-01-01 RX ORDER — GLYCOPYRROLATE 0.2 MG/ML
INJECTION, SOLUTION INTRAMUSCULAR; INTRAVENOUS PRN
Status: DISCONTINUED | OUTPATIENT
Start: 2023-01-01 | End: 2023-01-01

## 2023-01-01 RX ORDER — LISINOPRIL/HYDROCHLOROTHIAZIDE 10-12.5 MG
1 TABLET ORAL EVERY MORNING
COMMUNITY
Start: 2023-01-01

## 2023-01-01 RX ADMIN — MOXIFLOXACIN 1 DROP: 5 SOLUTION/ DROPS OPHTHALMIC at 07:27

## 2023-01-01 RX ADMIN — Medication 1 DROP: at 07:32

## 2023-01-01 RX ADMIN — GLYCOPYRROLATE 0.2 MG: 0.2 INJECTION, SOLUTION INTRAMUSCULAR; INTRAVENOUS at 09:58

## 2023-01-01 RX ADMIN — PROPOFOL 50 MG: 10 INJECTION, EMULSION INTRAVENOUS at 10:05

## 2023-01-01 RX ADMIN — SODIUM CHLORIDE, SODIUM LACTATE, POTASSIUM CHLORIDE, CALCIUM CHLORIDE: 600; 310; 30; 20 INJECTION, SOLUTION INTRAVENOUS at 09:21

## 2023-01-01 RX ADMIN — MOXIFLOXACIN 1 DROP: 5 SOLUTION/ DROPS OPHTHALMIC at 09:16

## 2023-01-01 RX ADMIN — MOXIFLOXACIN 1 DROP: 5 SOLUTION/ DROPS OPHTHALMIC at 07:19

## 2023-01-01 RX ADMIN — Medication 1 DROP: at 09:09

## 2023-01-01 RX ADMIN — MOXIFLOXACIN 1 DROP: 5 SOLUTION/ DROPS OPHTHALMIC at 09:09

## 2023-01-01 RX ADMIN — PROPARACAINE HYDROCHLORIDE 1 DROP: 5 SOLUTION/ DROPS OPHTHALMIC at 09:03

## 2023-01-01 RX ADMIN — DICLOFENAC SODIUM 1 DROP: 1 SOLUTION/ DROPS OPHTHALMIC at 09:16

## 2023-01-01 RX ADMIN — Medication 1 DROP: at 07:19

## 2023-01-01 RX ADMIN — LIDOCAINE HYDROCHLORIDE 100 MG: 20 INJECTION, SOLUTION INFILTRATION; PERINEURAL at 09:23

## 2023-01-01 RX ADMIN — ACETAMINOPHEN 975 MG: 325 TABLET ORAL at 09:18

## 2023-01-01 RX ADMIN — Medication 1 DROP: at 07:26

## 2023-01-01 RX ADMIN — DICLOFENAC SODIUM 1 DROP: 1 SOLUTION/ DROPS OPHTHALMIC at 07:26

## 2023-01-01 RX ADMIN — GLYCOPYRROLATE 0.2 MG: 0.2 INJECTION, SOLUTION INTRAMUSCULAR; INTRAVENOUS at 09:54

## 2023-01-01 RX ADMIN — PROPOFOL 100 MG: 10 INJECTION, EMULSION INTRAVENOUS at 09:23

## 2023-01-01 RX ADMIN — MOXIFLOXACIN 1 DROP: 5 SOLUTION/ DROPS OPHTHALMIC at 07:32

## 2023-01-01 RX ADMIN — Medication 1 DROP: at 09:16

## 2023-01-01 RX ADMIN — DICLOFENAC SODIUM 1 DROP: 1 SOLUTION/ DROPS OPHTHALMIC at 09:09

## 2023-01-01 RX ADMIN — SODIUM CHLORIDE, SODIUM LACTATE, POTASSIUM CHLORIDE, CALCIUM CHLORIDE: 600; 310; 30; 20 INJECTION, SOLUTION INTRAVENOUS at 07:34

## 2023-01-01 RX ADMIN — DICLOFENAC SODIUM 1 DROP: 1 SOLUTION/ DROPS OPHTHALMIC at 07:31

## 2023-01-01 RX ADMIN — Medication 1 DROP: at 09:04

## 2023-01-01 RX ADMIN — PROPARACAINE HYDROCHLORIDE 1 DROP: 5 SOLUTION/ DROPS OPHTHALMIC at 07:18

## 2023-01-01 RX ADMIN — DICLOFENAC SODIUM 1 DROP: 1 SOLUTION/ DROPS OPHTHALMIC at 07:19

## 2023-01-01 RX ADMIN — MOXIFLOXACIN 1 DROP: 5 SOLUTION/ DROPS OPHTHALMIC at 09:04

## 2023-01-01 ASSESSMENT — VISUAL ACUITY
OS_CC+: -1
CORRECTION_TYPE: GLASSES
METHOD: SNELLEN - LINEAR
METHOD: SNELLEN - LINEAR
OS_PH_CC+: -1
OD_CC+: -3
OD_CC: 20/25
OD_CC: 20/25
OS_SC: 20/20
OD_SC+: +1
OD_CC+: -2
OD_CC: 20/30
OD_SC: 20/40
OD_PH_CC: 20/30
OS_SC+: -2
OS_PH_CC: 20/100
METHOD: SNELLEN - LINEAR
OS_SC: CF5'
OD_CC: 20/30
OS_SC: 20/25
OD_SC: 20/80
METHOD: SNELLEN - LINEAR
OD_PH_CC+: -1
OD_PH_SC+: +
OS_PH_CC: 20/40
CORRECTION_TYPE: GLASSES
OD_PH_SC: 20/25
METHOD: SNELLEN - LINEAR
OD_CC+: -2
CORRECTION_TYPE: GLASSES
METHOD: SNELLEN - LINEAR
OS_CC: 20/125
METHOD: SNELLEN - LINEAR
OS_SC: 20/25
OS_CC: 20/50
CORRECTION_TYPE: GLASSES

## 2023-01-01 ASSESSMENT — EXTERNAL EXAM - LEFT EYE
OS_EXAM: NORMAL
OS_EXAM: BROW PTOSIS

## 2023-01-01 ASSESSMENT — REFRACTION_MANIFEST
OS_CYLINDER: +0.75
OS_SPHERE: +0.25
OS_ADD: +2.75
OD_ADD: +2.75
OD_SPHERE: +3.25
OD_AXIS: 135
OS_AXIS: .58
OS_CYLINDER: SPHERE
OD_CYLINDER: +0.75
OS_SPHERE: +3.50

## 2023-01-01 ASSESSMENT — PACHYMETRY
OD_CT(UM): 610
OD_CT(UM): 610
OS_CT(UM): 614
OS_CT(UM): 614

## 2023-01-01 ASSESSMENT — REFRACTION_WEARINGRX
OD_CYLINDER: +0.75
OS_ADD: +3.25
OD_AXIS: 133
OS_ADD: +3.25
OD_CYLINDER: +0.75
OS_AXIS: 058
OD_AXIS: 133
OD_CYLINDER: +0.75
OD_SPHERE: +2.75
OD_ADD: +3.25
OD_SPHERE: +2.75
OD_SPHERE: +2.75
OD_CYLINDER: +0.75
OS_ADD: +3.25
OS_CYLINDER: +1.00
OD_AXIS: 133
OS_CYLINDER: +1.00
OD_SPHERE: +2.75
OS_SPHERE: +2.75
OS_AXIS: 058
OS_ADD: +3.25
OS_SPHERE: +2.75
OS_CYLINDER: +1.00
OS_SPHERE: +2.75
OD_ADD: +3.25
OS_AXIS: 058
OD_AXIS: 133
OS_AXIS: 058
OS_SPHERE: +2.75
OS_CYLINDER: +1.00

## 2023-01-01 ASSESSMENT — CONF VISUAL FIELD
OD_SUPERIOR_NASAL_RESTRICTION: 0
OD_INFERIOR_TEMPORAL_RESTRICTION: 0
OD_INFERIOR_TEMPORAL_RESTRICTION: 0
OS_NORMAL: 1
OS_SUPERIOR_TEMPORAL_RESTRICTION: 0
METHOD: COUNTING FINGERS
OS_INFERIOR_TEMPORAL_RESTRICTION: 0
OS_NORMAL: 1
OD_NORMAL: 1
OD_SUPERIOR_NASAL_RESTRICTION: 0
OS_INFERIOR_NASAL_RESTRICTION: 0
METHOD: COUNTING FINGERS
OD_INFERIOR_NASAL_RESTRICTION: 0
OD_NORMAL: 1
OD_SUPERIOR_TEMPORAL_RESTRICTION: 0
OS_INFERIOR_TEMPORAL_RESTRICTION: 0
OD_INFERIOR_NASAL_RESTRICTION: 0
OS_SUPERIOR_TEMPORAL_RESTRICTION: 0
OS_INFERIOR_NASAL_RESTRICTION: 0
OS_SUPERIOR_NASAL_RESTRICTION: 0
OS_SUPERIOR_NASAL_RESTRICTION: 0
OD_SUPERIOR_TEMPORAL_RESTRICTION: 0

## 2023-01-01 ASSESSMENT — LIFESTYLE VARIABLES: TOBACCO_USE: 1

## 2023-01-01 ASSESSMENT — EXTERNAL EXAM - RIGHT EYE
OD_EXAM: NORMAL

## 2023-01-01 ASSESSMENT — TONOMETRY
OS_IOP_MMHG: 13
IOP_METHOD: TONOPEN
OD_IOP_MMHG: 15
OS_IOP_MMHG: 13
OS_IOP_MMHG: 19
OD_IOP_MMHG: 19
OD_IOP_MMHG: 19
IOP_METHOD: APPLANATION
IOP_METHOD: TONOPEN
OD_IOP_MMHG: 11
IOP_METHOD: ICARE
OS_IOP_MMHG: 16
OS_IOP_MMHG: 11
IOP_METHOD: TONOPEN
IOP_METHOD: TONOPEN
OS_IOP_MMHG: 11
OD_IOP_MMHG: 15
OD_IOP_MMHG: 19
IOP_METHOD: TONOPEN

## 2023-01-01 ASSESSMENT — ENCOUNTER SYMPTOMS: SEIZURES: 0

## 2023-01-01 ASSESSMENT — CUP TO DISC RATIO
OS_RATIO: 0.75
OS_RATIO: 0.75
OD_RATIO: 0.75
OD_RATIO: 0.75
OS_RATIO: 0.75
OD_RATIO: 0.75

## 2023-01-01 ASSESSMENT — SLIT LAMP EXAM - LIDS
COMMENTS: DERMATOCHALASIS
COMMENTS: 2+ DERMATOCHALASIS
COMMENTS: 2+ DERMATOCHALASIS
COMMENTS: DERMATOCHALASIS WITH LATERAL HOODING
COMMENTS: UL POSITION AS EXPECTED, NO PTOSIS
COMMENTS: 2+ DERMATOCHALASIS

## 2023-01-01 ASSESSMENT — PAIN SCALES - GENERAL
PAINLEVEL: NO PAIN (0)
PAINLEVEL: NO PAIN (0)

## 2023-01-01 ASSESSMENT — ACTIVITIES OF DAILY LIVING (ADL): ADLS_ACUITY_SCORE: 35

## 2023-01-06 NOTE — TELEPHONE ENCOUNTER
This encounter is being sent to inform the clinic that this patient has a referral from Dr Bharath Forbes for the diagnoses of Skin lesion on scalp, hyperpigmented 20mm x 20 mm. Present for about 8 years but enlarging. and has requested that this patient be seen within 3-5 days.  Based on the availability of our provider(s), we are unable to accommodate this request.      Were all sites offered this patient?  Yes      Does scheduling algorithm request to schedule next available?  Patient has been scheduled for the first available opening with Dr Kaz Mae on 6/3/23.  We have informed the patient that the clinic will review their referral and reach out if a sooner appointment is medically necessary.

## 2023-01-06 NOTE — TELEPHONE ENCOUNTER
Writer called  to communicate with patient regarding the referral for the spot that he has been referred for with the spot on his scalp. Patient has been scheduled to have this spot looked at.    Kadie BRIAN RN

## 2023-01-12 NOTE — NURSING NOTE
Lidocaine-epinephrine 1-1:630622 % injection   3mL once for one use, starting 1/12/2023 ending 1/12/2023,  2mL disp, R-0, injection  Injected by Dr. Poole

## 2023-01-12 NOTE — LETTER
1/12/2023       RE: Kristian HILL  [address]  [address]  [place] [address]     Dear Colleague,    Thank you for referring your patient, Kristian HILL, to the [facility] DERMATOLOGY [facility] [place] [facility]. Please see a copy of my visit note below.    [facility] Dermatology Note  Encounter Date: Jan 12, 2023  Date of Last Dermatology Office Visit: N/A (Patient new to dermatology clinic)    Dermatology Problem List:  #. NUB, 1.5 x 2 mm left frontal scalp  Ddx includes lentigo vs lentigo maligna  S/p shave biopsy 1/12/2023   #. Seborrheic dermatitis   Current treatment: ketoconazole shampoo and DermaSmoothe oil  ____________________________________________    Assessment & Plan:     #. Neoplasm of uncertain behavior of the skin - left front scalp  Differential at this time includes lentigo, flat SK, lentigo maligna  - Offered biopsy to the patient for histologic analysis.   - Described risks and benefits of biopsy; patient wishes to proceed.  - Written consent obtained shave biopsy performed  - See procedure note below.     #. Seborrheic dermatitis  # Pruritus  Assessment: Mild-moderate. Primary areas impacted include the scalp.   Plan:   -Start ketoconazole shampoo to be used 2-3 times weekly.   -Use instructions provided. May also use as a face wash.   -Start Derma-Smoothe (fluocinolone) oil 1-2 times weekly.       Procedures Performed: None  - Shave biopsy procedure note, location(s): left frontal scalp. After discussion of benefits and risks including but not limited to bleeding, infection, scar, incomplete removal, recurrence, and non-diagnostic biopsy, written consent and photographs were obtained. The area was cleaned with isopropyl alcohol. 0.5mL of 1% lidocaine with epinephrine was injected to obtain adequate anesthesia of lesion(s). Shave biopsy at site(s) performed. Hemostasis was achieved with aluminium chloride.  "Petrolatum ointment and a sterile dressing were applied. The patient tolerated the procedure and no complications were noted. The patient was provided with verbal and written post care instructions.     Follow-up: March for follow up of seb derm and monitoring of scalp lesion.     Staff and Resident:     Staff: [facility] Dermatology Attending Physician: Dr. Kaz Poole MD  PGY-2, Internal Medicine-Dermatology  Pager: TextPage Link  ____________________________________________    CC: Skin Check (Kristian is here today for a lesion on his scalp. He states \" I have a spot on my scalp x1.5 years. The spot is getting bigger that is why I want it looked at.\")      HPI:  Mr. Kristian HILL is a(n) 67 year old male who presents today as a new patient (or re-establishing care following two year hiatus) for the following chief complaint(s): Skin Check (Kristian is here today for a lesion on his scalp. He states \" I have a spot on my scalp x1.5 years. The spot is getting bigger that is why I want it looked at.\")     Duration of problem: 2 years  Primary location(s) affected: top of left forehead  Notes: slowly gotten bigger   -Denies pain, bleeding, itching, or other bothersome symptoms.   -Notes that he has continues to grow unchecked (started at roughly the size of a pea.  -Worried about the lesion and would like to know what it is.     -Denies personal or immediate family history of skin cancer.   -Denies occasional (though not frequent) history of peeling burns in childhood/adolescence.   -Reports any smoking or other tobacco use history.   -Denies any history of tanning bed use.     When asked, endorses that his scalp has been frequently itchy.     Patient is otherwise feeling well, without additional skin concerns.    Labs Reviewed:  N/A    Physical Exam:  Vitals: There were no vitals taken for this visit.  General: Well developed adult. Resting comfortably; no acute distress. Conversational and " "cooperative with exam.  HEENT: Anicteric sclera. EOMI. Mucous membranes moist.   Skin Exam: A focused exam of the scalp was performed.   - Barajas Type IV: Sun-occluded skin with moderate background pigmentation. .   - Patient with less than 100 nevi  - At the left frontal scalp there is a 1.5 x 2 cm irregular coffee-brown macule with globs of pigmentation intermixed. At the periphery of the macule there are spots (such as at the 5 o'clock position) were small fingers of pigment \"reach out,\" but respect the follicular ostea.   - At the scalp there is diffuse faint background erythema with notable perifollicular scale.   - No other lesions of concern on areas examined.   Psych: Appropriate mood and affect for situation.    Medications:  Current Outpatient Medications   Medication     ARTIFICIAL TEARS 1 % ophthalmic solution     lisinopril-hydrochlorothiazide (ZESTORETIC) 10-12.5 MG tablet     omeprazole (PRILOSEC) 20 MG DR capsule     tamsulosin (FLOMAX) 0.4 MG capsule     No current facility-administered medications for this visit.        Past Medical History:   There is no problem list on file for this patient.    No past medical history on file.    CC Bharath Forbes MD  [address] [place] [address] on close of this encounter.    Attestation with edits by Kaz Mae MD at 1/12/2023 12:35 PM (Updated):  I have personally examined this patient and agree with the resident doctor's documentation and plan of care. I have reviewed and amended the resident's note. The documentation accurately reflects my clinical observations, diagnoses, treatment and follow-up plans. I was present for key portions of the procedure(s).       Kaz Mae MD  Dermatology Staff    "

## 2023-01-12 NOTE — PROGRESS NOTES
[facility] Dermatology Note  Encounter Date: Jan 12, 2023  Date of Last Dermatology Office Visit: N/A (Patient new to dermatology clinic)    Dermatology Problem List:  #. NUB, 1.5 x 2 mm left frontal scalp  Ddx includes lentigo vs lentigo maligna  S/p shave biopsy 1/12/2023   #. Seborrheic dermatitis   Current treatment: ketoconazole shampoo and DermaSmoothe oil  ____________________________________________    Assessment & Plan:     #. Neoplasm of uncertain behavior of the skin - left front scalp  Differential at this time includes lentigo, flat SK, lentigo maligna  - Offered biopsy to the patient for histologic analysis.   - Described risks and benefits of biopsy; patient wishes to proceed.  - Written consent obtained shave biopsy performed  - See procedure note below.     #. Seborrheic dermatitis  # Pruritus  Assessment: Mild-moderate. Primary areas impacted include the scalp.   Plan:   -Start ketoconazole shampoo to be used 2-3 times weekly.   -Use instructions provided. May also use as a face wash.   -Start Derma-Smoothe (fluocinolone) oil 1-2 times weekly.       Procedures Performed: None  - Shave biopsy procedure note, location(s): left frontal scalp. After discussion of benefits and risks including but not limited to bleeding, infection, scar, incomplete removal, recurrence, and non-diagnostic biopsy, written consent and photographs were obtained. The area was cleaned with isopropyl alcohol. 0.5mL of 1% lidocaine with epinephrine was injected to obtain adequate anesthesia of lesion(s). Shave biopsy at site(s) performed. Hemostasis was achieved with aluminium chloride. Petrolatum ointment and a sterile dressing were applied. The patient tolerated the procedure and no complications were noted. The patient was provided with verbal and written post care instructions.     Follow-up: March for follow up of seb derm and monitoring of scalp lesion.     Staff and Resident:     Staff: [facility]  "Dermatology Attending Physician: Dr. Kaz Poole MD  PGY-2, Internal Medicine-Dermatology  Pager: TextPage Link  ____________________________________________    CC: Skin Check (Kristian is here today for a lesion on his scalp. He states \" I have a spot on my scalp x1.5 years. The spot is getting bigger that is why I want it looked at.\")      HPI:  Mr. Kristian HILL is a(n) 67 year old male who presents today as a new patient (or re-establishing care following two year hiatus) for the following chief complaint(s): Skin Check (Kristian is here today for a lesion on his scalp. He states \" I have a spot on my scalp x1.5 years. The spot is getting bigger that is why I want it looked at.\")     Duration of problem: 2 years  Primary location(s) affected: top of left forehead  Notes: slowly gotten bigger   -Denies pain, bleeding, itching, or other bothersome symptoms.   -Notes that he has continues to grow unchecked (started at roughly the size of a pea.  -Worried about the lesion and would like to know what it is.     -Denies personal or immediate family history of skin cancer.   -Denies occasional (though not frequent) history of peeling burns in childhood/adolescence.   -Reports any smoking or other tobacco use history.   -Denies any history of tanning bed use.     When asked, endorses that his scalp has been frequently itchy.     Patient is otherwise feeling well, without additional skin concerns.    Labs Reviewed:  N/A    Physical Exam:  Vitals: There were no vitals taken for this visit.  General: Well developed adult. Resting comfortably; no acute distress. Conversational and cooperative with exam.  HEENT: Anicteric sclera. EOMI. Mucous membranes moist.   Skin Exam: A focused exam of the scalp was performed.   - Barajas Type IV: Sun-occluded skin with moderate background pigmentation. .   - Patient with less than 100 nevi  - At the left frontal scalp there is a 1.5 x 2 cm irregular " "coffee-brown macule with globs of pigmentation intermixed. At the periphery of the macule there are spots (such as at the 5 o'clock position) were small fingers of pigment \"reach out,\" but respect the follicular ostea.   - At the scalp there is diffuse faint background erythema with notable perifollicular scale.   - No other lesions of concern on areas examined.   Psych: Appropriate mood and affect for situation.    Medications:  Current Outpatient Medications   Medication    ARTIFICIAL TEARS 1 % ophthalmic solution    lisinopril-hydrochlorothiazide (ZESTORETIC) 10-12.5 MG tablet    omeprazole (PRILOSEC) 20 MG DR capsule    tamsulosin (FLOMAX) 0.4 MG capsule     No current facility-administered medications for this visit.        Past Medical History:   There is no problem list on file for this patient.    No past medical history on file.    CC Bharath Forbes MD  [address] [place] [address] on close of this encounter.  "

## 2023-01-12 NOTE — NURSING NOTE
"Dermatology Rooming Note    Kristian HILL's goals for this visit include:   Chief Complaint   Patient presents with     Skin Check     Kristian is here today for a lesion on his scalp. He states \" I have a spot on my scalp x1.5 years. The spot is getting bigger that is why I want it looked at.\"     Magui Cooper, RMZULEYKA  "

## 2023-01-12 NOTE — PATIENT INSTRUCTIONS

## 2023-01-14 NOTE — RESULT ENCOUNTER NOTE
Results consistent with benign solar lentigo. No further treatment indicated.     Called patient at number in Epic to convey results. Patient expressed understanding and appreciation.     Kaykay Poole  PGY-3, Internal Medicine-Dermatology  Pager: *[phone] or TextPage Link     Attending cc'd as FYI only

## 2023-01-18 NOTE — TELEPHONE ENCOUNTER
Screening Questions  BLUE  KIND OF PREP RED  LOCATION [review exclusion criteria] GREEN  SEDATION TYPE        Y Are you active on mychart?       KIMMY NAZARIO Ordering/Referring Provider?        MEDICARE, MEDICAID What type of coverage do you have?      N Have you had a positive covid test in the last 14 days?     32.8 1. BMI  [BMI 40+ - review exclusion criteria]    Y  2. Are you able to give consent for your medical care? [IF NO,RN REVIEW]          N  3. Are you taking any prescription pain medications on a routine schedule   (ex narcotics: tramadol, oxycodone, roxicodone, oxycontin,  and percocet)?          3a. EXTENDED PREP What kind of prescription?     N 4. Do you have any chemical dependencies such as alcohol, street drugs, or methadone?        **If yes 3- 5 , please schedule with MAC sedation.**          IF YES TO ANY 6 - 10 - HOSPITAL SETTING ONLY.     N 6.   Do you need assistance transferring?     N 7.   Have you had a heart or lung transplant?    N 8.   Are you currently on dialysis?   N 9.   Do you use daily home oxygen?   N 10. Do you take nitroglycerin?   10a.  If yes, how often?     11. [FEMALES]   Are you currently pregnant?    11a.  If yes, how many weeks? [ Greater than 12 weeks, OR NEEDED]    ** Patient had to leave for another appt and could not finish screening. Ok for follow up call.

## 2023-01-25 NOTE — TELEPHONE ENCOUNTER
"    Screening Questions  BLUE  KIND OF PREP RED  LOCATION [review exclusion criteria] GREEN  SEDATION TYPE        N Are you active on mychart?        Bharath Forbes MD Ordering/Referring Provider?        MEDICARE/MEDICAID What type of coverage do you have?      N Have you had a positive covid test in the last 14 days?     33.7 1. BMI  [BMI 40+ - review exclusion criteria]    Y  2. Are you able to give consent for your medical care? [IF NO,RN REVIEW]          N  3. Are you taking any prescription pain medications on a routine schedule   (ex narcotics: tramadol, oxycodone, roxicodone, oxycontin,  and percocet)?          3a. EXTENDED PREP What kind of prescription?     N 4. Do you have any chemical dependencies such as alcohol, street drugs, or methadone?        **If yes 3- 5 , please schedule with MAC sedation.**          IF YES TO ANY 6 - 10 - HOSPITAL SETTING ONLY.     N 6.   Do you need assistance transferring?     N 7.   Have you had a heart or lung transplant?    N 8.   Are you currently on dialysis?   N 9.   Do you use daily home oxygen?   N 10. Do you take nitroglycerin?   10a.  If yes, how often?     11. [FEMALES]   Are you currently pregnant?    11a.  If yes, how many weeks? [ Greater than 12 weeks, OR NEEDED]    N 12. Do you have Pulmonary Hypertension? *NEED PAC APPT AT [facility]*     N 13. [review exclusion criteria]  Do you have any implantable devices in your body (pacemaker, defib, LVAD)?    N 14. In the past 6 months, have you had any heart related issues including cardiomyopathy or heart attack?     14a.  If yes, did it require cardiac stenting if so when?     N 15. Have you had a stroke or Transient ischemic attack (TIA - aka  mini stroke ) within 6 months?      N 16. Do you have mod to severe Obstructive Sleep Apnea?  [Hospital only]    N 17. Do you have SEVERE AND UNCONTROLLED asthma? *NEED PAC APPT AT [facility]*     N 18. Are you currently taking any blood thinners?     18a. If yes, inform patient to \"follow " "up w/ ordering provider for bridging instructions.\"    N 19. Do you take the medication Phentermine?    19a. If yes, \"Hold for 7 days before procedure.  Please consult your prescribing provider if you have questions about holding this medication.\"     N  20. Do you have chronic kidney disease?      N  21. Do you have a diagnosis of diabetes?     N  22. On a regular basis do you go 3-5 days between bowel movements?      23. Preferred LOCAL Pharmacy for Pre Prescription    [ LIST ONLY ONE PHARMACY]     [id number] - [place], [place] - [address]        - CLOSING REMINDERS -    Informed patient they will need an adult    Cannot take any type of public or medical transportation alone    Conscious Sedation- Needs  for 6 hours after the procedure       MAC/General-Needs  for 24 hours after procedure    Pre-Procedure Covid test to be completed [[facility] PCR Testing Required]    Confirmed Nurse will call to complete assessment       - SCHEDULING DETAILS -  no Hospital Setting Required? If yes, what is the exclusion?: n/a     Surgeon      Date of Procedure  Lower Endoscopy [Colonoscopy]  Type of Procedure Scheduled  [facility]-If you answer yes to questions #8, #20, #21Which Colonoscopy Prep was Sent?     MAC Sedation Type     N PAC / Pre-op Required     Patient will call back to schedule if he is able to find a /.                "

## 2023-02-07 NOTE — TELEPHONE ENCOUNTER
Attempted to reach patient to schedule follow up in the Dermatology Clinic.  No answer,  LM on VM to call office with the assistance of a  and Letter mailed..    Schedule with Dr. Kaz Mae.

## 2023-02-07 NOTE — LETTER
February 7, 2023          Kristian HILL  [address]  [address]  [facility] [address]        Dear Kristian HILL:    We recently reviewed your medical records from [facility] and found that you are due for an appointment with Dr. Kaz Mae.  Our office has attempted to reach you via telephone and left a message.    At your earliest convenience, please call the clinic at [phone] to arrange the recommended exam and possible testing.  Please kindly mention this letter when calling so that your appointment(s) can be accurately scheduled.      Sincerely,       The Clinic Staff        Medical Record Number:  [id number]

## 2023-02-08 NOTE — PROGRESS NOTES
HPI     Follow Up    In both eyes.  Associated symptoms include floaters.  Negative for photophobia, flashes and foreign body sensation.  Treatments tried include artificial tears.  Pain was noted as 0/10.           Comments    Follow up for glaucoma suspect.    The patient has a left eye spot or something passing in front of his eye.  He notes it is difficult to open the NIKKI in the morning.  He notes his vision with his glasses is unchanged.  MONA Fink, MONA 8:39 AM 02/08/2023              Last edited by Kaur Barreto COA on 2/8/2023  8:39 AM.         Review of systems for the eyes was negative other than the pertinent positives/negatives listed in the HPI.      Assessment & Plan    HPI:  Kristian HILL is a 67 year old male who presents for glaucoma suspicion follow up. No new vision changes, eye pain, flashes, or change in floaters. He has been using AT PRN.    POHx: bifocal use  PMHx:  History reviewed. No pertinent past medical history.    Current Medications: ARTIFICIAL TEARS 1 % ophthalmic solution, APPLY 1 DROP TO EYE 4 (FOUR) TIMES DAILY AS NEEDED (FOR DRY EYES) FOR UP TO 90 DAYS  fluocinolone acetonide (DERMA SMOOTHE/FS BODY) 0.01 % external oil, Use once every other day for scalp itching. After one month, use once weekly.  ketoconazole (NIZORAL) 2 % external shampoo, Scrub into scalp 2-3 times a week for flaking and itching. After application, wait for five minutes before rinsing thoroughly.  lisinopril-hydrochlorothiazide (ZESTORETIC) 10-12.5 MG tablet, Take 1 tablet by mouth daily  omeprazole (PRILOSEC) 20 MG DR capsule, TAKE 1 CAPSULE BY MOUTH EVERY DAY **DO NOT CRUSH**  tamsulosin (FLOMAX) 0.4 MG capsule,     No current facility-administered medications on file prior to visit.    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   (primary encounter diagnosis)  Current IOP: 15/13  T max: 18/15  C/D: 0.75/0.75  Pachymetry: 610/614  FH of glaucoma: no known  OCT nerve fiber layer 12/07/22:   Right eye - G(y) 79 NI (g) 113 TI (g) 117 NS (y) 60 TS (g) 110      Left eye - G(y) 78 NI (g) 113 TI (g) 94 NS (g) 101 TS (y) 79  Gonioscopy: open to CBB, heavily pigmented angle each eye   Ethnicity:   Prior surgical interventions: none  -HVF 24-2 (2/8/23):   -right eye: sup and inf arc deficits; FN 20%   -left eye: sup arc, IT deficits; reliable  -IOP has been wnl and deficits on visual field testing today appear to be out of proportion to mild RNFL thinning. Will repeat HVF at follow up visit to establish baseline and monitor for progression.    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 No treatment currently recommended. The patient will monitor for vision changes and contact us with any decrease in vision. Recheck next visit        (H04.123) Dry eyes, bilateral  (H02.883,  H02.886) Meibomian gland dysfunction (MGD) of both eyes  Comment: Already using AT  Plan: increase using AT to four times a day  martín, warm compresses as needed  Decrease visual acuity possibly due to surface    (H52.03,  H52.203,  H52.4) Hyperopia of both eyes with astigmatism and presbyopia  Minimal improvement with manifest refraction today  Surface vs cataract  Treat surface and RIGHT eye-evaluate    Return in about 6 months (around 8/8/2023) for Follow Up with HVF 24-2.        Thank you for entrusting us with your care  Rosalia Solitario MD  Ophthalmology Resident, PGY-4        Attending Physician Attestation:  Complete documentation of historical and exam elements from today's encounter can be found in the full encounter summary report (not  reduplicated in this progress note).  I personally obtained the chief complaint(s) and history of present illness.  I confirmed and edited as necessary the review of systems, past medical/surgical history, family history, social history, and examination findings as documented by others; and I examined the patient myself.  I personally reviewed the relevant tests, images, and reports as documented above.  I formulated and edited as necessary the assessment and plan and discussed the findings and management plan with the patient and family. - Lawrence Coffey MD

## 2023-02-08 NOTE — NURSING NOTE
Chief Complaints and History of Present Illnesses   Patient presents with     Follow Up     Chief Complaint(s) and History of Present Illness(es)     Follow Up            Laterality: both eyes    Associated symptoms: floaters.  Negative for photophobia, flashes and foreign body sensation    Treatments tried: artificial tears    Pain scale: 0/10          Comments    Follow up for glaucoma suspect.    The patient has a left eye spot or something passing in front of his eye.  He notes it is difficult to open the NIKKI in the morning.  He notes his vision with his glasses is unchanged.  Kaur Barreto, COA, COA 8:39 AM 02/08/2023

## 2023-02-20 NOTE — TELEPHONE ENCOUNTER
Used  line to call  to remind patient of their upcoming appointment with our GI clinic, on Monday 2/27/2023 at 12:30pm with Dr. Darryl Fisher . This appointment is scheduled as an in-person appt. Please arrive 15 minutes early to check in for your appointment. , if your appointment is virtual (video or telephone) you need to be in [place] for the visit. To reschedule or cancel patient to call [phone].    Debora Glover

## 2023-02-22 NOTE — TELEPHONE ENCOUNTER
Used  line to call to remind patient of their upcoming appointment with our GI clinic, on Monday 2/27/2023 at 12:30pm with Dr. Darryl Fisher . This appointment is scheduled as an in-person appt. Please arrive 15 minutes early to check in for your appointment. , if your appointment is virtual (video or telephone) you need to be in [place] for the visit. To reschedule or cancel patient to call [phone].     Confirmed appt with patient    Debora Glover

## 2023-02-27 PROBLEM — I10 PRIMARY HYPERTENSION: Status: ACTIVE | Noted: 2023-01-01

## 2023-02-27 PROBLEM — K81.0 ACUTE CHOLECYSTITIS: Status: ACTIVE | Noted: 2022-04-23

## 2023-02-27 PROBLEM — B18.2 CHRONIC HEPATITIS C WITHOUT HEPATIC COMA (H): Status: ACTIVE | Noted: 2023-01-01

## 2023-02-27 PROBLEM — N13.8 BPH WITH OBSTRUCTION/LOWER URINARY TRACT SYMPTOMS: Status: ACTIVE | Noted: 2021-02-24

## 2023-02-27 PROBLEM — Z22.7 LATENT TUBERCULOSIS INFECTION: Status: ACTIVE | Noted: 2023-01-01

## 2023-02-27 PROBLEM — N40.1 BPH WITH OBSTRUCTION/LOWER URINARY TRACT SYMPTOMS: Status: ACTIVE | Noted: 2021-02-24

## 2023-02-27 NOTE — PATIENT INSTRUCTIONS
Recommendations:    Blood test today.  We will schedule a follow-up appointment with you in May to discuss treatment of hepatitis C.  We will schedule a colonoscopy in May to follow up colon polyps.    If you have any questions about your liver disease care or tests, you can call the clinic at [phone].

## 2023-02-27 NOTE — PROGRESS NOTES
[facility] [place] [facility]       Hepatology Clinic    Date of Service: 2/27/2023       Primary Care Provider: Dr. Forbes    History of Present Illness     Mr. Jaimes is sent in consultation by Dr. Forbes because of hepatitis C.  An audio  was used.    The patient is not a good historian, but tells me that he was unaware of hepatitis C until he was tested earlier this year through his new primary care clinic.  However, in care everywhere, I noticed a positive hepatitis C test at [facility] in 2021.  As best he knows, this has never been addressed.  He reports no history of IV drug use or transfusion.  He is unaware of any family history of liver disease.    The patient reports a history of substantial alcohol use, but states he is quit since January.  He is unaware of any history of liver problems.    Overall, the patient states he feels well and reports no constitutional, GI, or hepatic complaints.    Please see his primary care clinic notes from January 2023 by Dr. Forbes regarding his medical history and other issues.    The patient is a retired .  He lives alone.  He has no family in the [facility].  He will be visiting [place] for prolonged visit in the near future.    Past Medical History:  No past medical history on file.    Patient Active Problem List   Diagnosis     Acute cholecystitis     Alcohol abuse     BPH with obstruction/lower urinary tract symptoms     Chronic hepatitis C without hepatic coma (H)     Latent tuberculosis infection     Primary hypertension       Surgical History:  No past surgical history on file.    Social History:  Social History     Tobacco Use     Smoking status: Some Days     Types: Cigarettes     Smokeless tobacco: Never       Family History:  No family history on file.    Medications:  Current Outpatient Medications   Medication     ARTIFICIAL TEARS 1 % ophthalmic solution     fluocinolone acetonide (DERMA SMOOTHE/FS BODY) 0.01 % external oil      "ketoconazole (NIZORAL) 2 % external shampoo     lisinopril-hydrochlorothiazide (ZESTORETIC) 10-12.5 MG tablet     omeprazole (PRILOSEC) 20 MG DR capsule     tamsulosin (FLOMAX) 0.4 MG capsule     No current facility-administered medications for this visit.         Objective:         Vitals:    02/27/23 1245   BP: (!) 146/87   BP Location: Left arm   Patient Position: Sitting   Cuff Size: Adult Large   Pulse: 71   SpO2: 99%   Weight: 86 kg (189 lb 8 oz)   Height: 1.6 m (5' 3\")     Body mass index is 33.57 kg/m .     Physical Exam  Constitutional: Well-developed, well-nourished, in no apparent distress.    HEENT: No scleral icterus. Moist oral mucosa.  GI:  Abdomen soft, non-distended, non-tender.  No overt hepatosplenomegaly.   Cholecystectomy scars.  Skin: No spider nevi noted.    Musculoskeletal:  ROM intact, grossly no muscle bulk    Psychiatric: Normal mood and affect. Behavior is normal.  Neuro: No asterixis    Labs and Diagnostic tests:  Lab Results   Component Value Date     01/18/2023    POTASSIUM 3.8 01/18/2023    CHLORIDE 103 01/18/2023    CO2 22 01/18/2023    BUN 11.3 01/18/2023    CR 0.82 01/18/2023     Lab Results   Component Value Date    BILITOTAL 0.7 01/04/2023     01/04/2023    AST 66 01/04/2023    ALKPHOS 80 01/04/2023     Lab Results   Component Value Date    ALBUMIN 4.3 01/04/2023    PROTTOTAL 8.4 01/04/2023      No results found for: WBC, HGB, MCV, PLT  Lab Results   Component Value Date    INR 1.01 01/18/2023     HCV PCR positive in 2021 ([facility]) and 2023    US 1-14-23  The liver demonstrates normal homogeneous echotexture.   Cholecystectomy.      Assessment and Plan:    1.  Hepatitis C.  This is an asymptomatic patient with hepatitis C of unknown duration.  He does not note any specific risk factors.  His symptoms, exam, abdominal ultrasound, and labs do not suggest advanced liver disease.    Via the , I explained that hepatitis C is quite common.  There is a risk of " progressive liver disease and other medical problems, and therefore we suggest treatment to eradicate the virus.  I also commended him on stopping alcohol, and recommended that he continue to avoid this.    Our plan today is to obtain a hepatitis C genotype, and then see the patient back in about 2 months (when he returns from [place]) to discuss antiviral treatment.  The patient reports that he is also due for another colonoscopy because of history of colon polyps, and we will schedule this for me.    His questions were answered.          About 45 minutes spent today with patient, reviewing results, and coordinating care.    This note was created with voice recognition software, and while reviewed for accuracy, typos may remain.        Darryl Fisher MD  Professor of Medicine  [facility]  Division of Gastroenterology, Hepatology, and Nutrition

## 2023-02-27 NOTE — NURSING NOTE
"Chief Complaint   Patient presents with     New Patient       Vitals:    02/27/23 1245   BP: (!) 146/87   BP Location: Left arm   Patient Position: Sitting   Cuff Size: Adult Large   Pulse: 71   SpO2: 99%   Weight: 86 kg (189 lb 8 oz)   Height: 1.6 m (5' 3\")       Body mass index is 33.57 kg/m .    Debora Logic    "

## 2023-02-27 NOTE — LETTER
2/27/2023         RE: Kristian HILL  [address]  [address]  [place] [address]        Dear Colleague,    Thank you for referring your patient, Kristian HILL, to the [facility] [place]. Please see a copy of my visit note below.    [facility]       Hepatology Clinic    Date of Service: 2/27/2023       Primary Care Provider: Dr. Forbes    History of Present Illness     Mr. Hill is sent in consultation by Dr. Forbes because of hepatitis C.  An audio  was used.    The patient is not a good historian, but tells me that he was unaware of hepatitis C until he was tested earlier this year through his new primary care clinic.  However, in care everywhere, I noticed a positive hepatitis C test at [facility] in 2021.  As best he knows, this has never been addressed.  He reports no history of IV drug use or transfusion.  He is unaware of any family history of liver disease.    The patient reports a history of substantial alcohol use, but states he is quit since January.  He is unaware of any history of liver problems.    Overall, the patient states he feels well and reports no constitutional, GI, or hepatic complaints.    Please see his primary care clinic notes from January 2023 by Dr. Forbes regarding his medical history and other issues.    The patient is a retired .  He lives alone.  He has no family in the [facility].  He will be visiting [place] for prolonged visit in the near future.    Past Medical History:  No past medical history on file.    Patient Active Problem List   Diagnosis     Acute cholecystitis     Alcohol abuse     BPH with obstruction/lower urinary tract symptoms     Chronic hepatitis C without hepatic coma (H)     Latent tuberculosis infection     Primary hypertension       Surgical History:  No past surgical history on file.    Social History:  Social History     Tobacco Use     Smoking status: Some Days     Types:  "Cigarettes     Smokeless tobacco: Never       Family History:  No family history on file.    Medications:  Current Outpatient Medications   Medication     ARTIFICIAL TEARS 1 % ophthalmic solution     fluocinolone acetonide (DERMA SMOOTHE/FS BODY) 0.01 % external oil     ketoconazole (NIZORAL) 2 % external shampoo     lisinopril-hydrochlorothiazide (ZESTORETIC) 10-12.5 MG tablet     omeprazole (PRILOSEC) 20 MG DR capsule     tamsulosin (FLOMAX) 0.4 MG capsule     No current facility-administered medications for this visit.         Objective:         Vitals:    02/27/23 1245   BP: (!) 146/87   BP Location: Left arm   Patient Position: Sitting   Cuff Size: Adult Large   Pulse: 71   SpO2: 99%   Weight: 86 kg (189 lb 8 oz)   Height: 1.6 m (5' 3\")     Body mass index is 33.57 kg/m .     Physical Exam  Constitutional: Well-developed, well-nourished, in no apparent distress.    HEENT: No scleral icterus. Moist oral mucosa.  GI:  Abdomen soft, non-distended, non-tender.  No overt hepatosplenomegaly.   Cholecystectomy scars.  Skin: No spider nevi noted.    Musculoskeletal:  ROM intact, grossly no muscle bulk    Psychiatric: Normal mood and affect. Behavior is normal.  Neuro: No asterixis    Labs and Diagnostic tests:  Lab Results   Component Value Date     01/18/2023    POTASSIUM 3.8 01/18/2023    CHLORIDE 103 01/18/2023    CO2 22 01/18/2023    BUN 11.3 01/18/2023    CR 0.82 01/18/2023     Lab Results   Component Value Date    BILITOTAL 0.7 01/04/2023     01/04/2023    AST 66 01/04/2023    ALKPHOS 80 01/04/2023     Lab Results   Component Value Date    ALBUMIN 4.3 01/04/2023    PROTTOTAL 8.4 01/04/2023      No results found for: WBC, HGB, MCV, PLT  Lab Results   Component Value Date    INR 1.01 01/18/2023     [place] and [place]    US 1-14-23  The liver demonstrates normal homogeneous echotexture.   Cholecystectomy.      Assessment and Plan:    1.  Hepatitis C.  This is an asymptomatic " patient with hepatitis C of unknown duration.  He does not note any specific risk factors.  His symptoms, exam, abdominal ultrasound, and labs do not suggest advanced liver disease.    Via the , I explained that hepatitis C is quite common.  There is a risk of progressive liver disease and other medical problems, and therefore we suggest treatment to eradicate the virus.  I also commended him on stopping alcohol, and recommended that he continue to avoid this.    Our plan today is to obtain a hepatitis C genotype, and then see the patient back in about 2 months (when he returns from [place]) to discuss antiviral treatment.  The patient reports that he is also due for another colonoscopy because of history of colon polyps, and we will schedule this for me.    His questions were answered.          About 45 minutes spent today with patient, reviewing results, and coordinating care.    This note was created with voice recognition software, and while reviewed for accuracy, typos may remain.        Darryl Fisher MD  Professor of Medicine  [facility]  Division of Gastroenterology, Hepatology, and Nutrition

## 2023-03-27 NOTE — TELEPHONE ENCOUNTER
Screening Questions  BLUE  KIND OF PREP RED  LOCATION [review exclusion criteria] GREEN  SEDATION TYPE        N - MAILED Are you active on mychart?       KEARA TURNER Ordering/Referring Provider?        MEDICARE, MEDICAID What type of coverage do you have?      N Have you had a positive covid test in the last 14 days?     33.6 1. BMI  [BMI 40+ - review exclusion criteria]    Y  2. Are you able to give consent for your medical care? [IF NO,RN REVIEW]          N  3. Are you taking any prescription pain medications on a routine schedule   (ex narcotics: oxycodone, roxicodone, oxycontin,  and percocet)? [RN Review]          3a. EXTENDED PREP What kind of prescription?     N 4. Do you have any chemical dependencies such as alcohol, street drugs, or methadone?        **If yes 3- 5 , please schedule with MAC sedation.**          IF YES TO ANY 6 - 10 - HOSPITAL SETTING ONLY.     N 6.   Do you need assistance transferring?     N 7.   Have you had a heart or lung transplant?    N 8.   Are you currently on dialysis?   N 9.   Do you use daily home oxygen?   N 10. Do you take nitroglycerin?   10a.  If yes, how often?     11. [FEMALES]   Are you currently pregnant?    11a.  If yes, how many weeks? [ Greater than 12 weeks, OR NEEDED]    N 12. Do you have Pulmonary Hypertension? *NEED PAC APPT AT [facility] w/ MAC*     N 13. [review exclusion criteria]  Do you have any implantable devices in your body (pacemaker, defib, LVAD)?    N 14. In the past 6 months, have you had any heart related issues including cardiomyopathy or heart attack?     14a.  If yes, did it require cardiac stenting if so when?     N 15. Have you had a stroke or Transient ischemic attack (TIA - aka  mini stroke ) within 6 months?      N 16. Do you have mod to severe Obstructive Sleep Apnea?  [Hospital only]    N 17. Do you have SEVERE AND UNCONTROLLED asthma? *NEED PAC APPT AT [facility] w/MAC*     18. Are you currently taking any blood thinners?     18a. No.  "Continue to 19.   18b. Yes/no Blood Thinner: No. Inform patient to \"follow up w/ ordering provider for bridging instructions.\"    N 19. Do you take the medication Phentermine?    19a. If yes, \"Hold for 7 days before procedure.  Please consult your prescribing provider if you have questions about holding this medication.\"     N  20. Do you have chronic kidney disease?      N  21. Do you have a diagnosis of diabetes?     N  22. On a regular basis do you go 3-5 days between bowel movements?      23. Preferred LOCAL Pharmacy for Pre Prescription    [ LIST ONLY ONE PHARMACY]     [facility] #[id number] - [place], [place] - [address]    - CLOSING REMINDERS -    Informed patient they will need an adult    Cannot take any type of public or medical transportation alone    Conscious Sedation- Needs  for 6 hours after the procedure       MAC/General-Needs  for 24 hours after procedure    Pre-Procedure [facility] be [facility]]    Confirmed Nurse will call to complete assessment       - SCHEDULING DETAILS -  NO Hospital Setting Required? If yes, what is the exclusion?:    JOHNNY  Surgeon    6/19/2023  Date of Procedure  Lower Endoscopy [Colonoscopy]  Type of Procedure Scheduled  [facility] Location   STANDARD MIRALAX Which Colonoscopy Prep was Sent?     MODERATE Sedation Type     NO PAC / Pre-op Required         Patient in [place] until 5/1/2023.        "

## 2023-06-02 NOTE — TELEPHONE ENCOUNTER
Utilized [employer] .    Attempted to contact patient regarding upcoming Colonoscopy procedure on 6/19/23 for pre assessment questions. No answer.     Left message to return call to [phone] #4    Discuss Covid policy and designated  policy.    Pre op exam? N/A    Arrival time: 1000. Procedure time: 1100    Facility location: [facility]; [address], [address], [place] [address]    Sedation type: Conscious sedation     NSAIDs? Yes, per external med list:  Ibuprofen (Advil, Motrin) -Holding interval of 1 day before procedure.  ASA 81 mg - no indication to hold    Anticoagulants: No    Electronic implanted devices? No    Diabetic? No    Indication for procedure: hx of colon polyps    Bowel prep recommendation: Miralax prep without magnesium citrate d/t recall    Prep instructions previously sent (3/27/23) via letter by scheduling team.       Shwetha Zepeda RN  Endoscopy Procedure Pre Assessment RN

## 2023-06-12 NOTE — TELEPHONE ENCOUNTER
Pre assessment questions completed for upcoming Colonoscopy  procedure scheduled on 6/19/23    Via  ID [id number]    Pt also put his boss Rafael on the phone to help with the instructions.     COVID policy reviewed.     Reviewed procedural arrival time 1000, procedure time 1100 and facility location [facility]; [address], [address], [place] [address]    Designated  policy reviewed. Instructed to have someone stay 6 hours post procedure.     NSAIDs? No - Pt states he no longer takes any medications    Anticoagulation/blood thinners? No    Electronic implanted devices? No    Pt declined to Review procedure prep instructions. He stated he has the letter at home and if he has questions he will call back.    Patient verbalized understanding and had no questions or concerns at this time.    Zabrina Gutierrez RN  Endoscopy Procedure Pre Assessment RN

## 2023-06-14 NOTE — NURSING NOTE
Chief Complaints and History of Present Illnesses   Patient presents with     Glaucoma Suspect Follow Up     Chief Complaint(s) and History of Present Illness(es)     Glaucoma Suspect Follow Up            Laterality: both eyes    Associated symptoms: Negative for double vision, dryness, flashes and floaters    Pain scale: 0/10          Comments    Follow up glaucoma suspect. He reports that the left eye has been blurry but seems worse.  He now has to close his left eye to see.   The patient reports left eye is not as good as right eye.    Kaur Barreto, COA, COA 2:50 PM 06/14/2023

## 2023-06-14 NOTE — PROGRESS NOTES
HPI     Glaucoma Suspect Follow Up    In left eye.  Vision is blurred.  Associated symptoms include Negative for double vision, dryness, flashes and floaters.  Pain was noted as 0/10.           Comments    Follow up glaucoma suspect.   The patient reports left eye is not as good as right eye.  He reports that the left eye has been blurry but seems worse.  He now has to close his left eye to see.   Kaur Barreto, COA, COA 2:50 PM 06/14/2023        He states this has been progressive since his last visit. Denies eye pain, pain with eye movements, pulsatile tinnitus, headaches, scalp tenderness, girdle weakness, flashes or floaters.           Last edited by Lawrence Coffey MD on 6/14/2023  4:01 PM.         Review of systems for the eyes was negative other than the pertinent positives/negatives listed in the HPI.      Assessment & Plan    HPI:  Kristian HILL is a 68 year old male who presents for glaucoma suspicion follow up. Notes severely reduced vision left eye       POHx: bifocal use  PMHx:  History reviewed. No pertinent past medical history.    Current Medications: ARTIFICIAL TEARS 1 % ophthalmic solution, APPLY 1 DROP TO EYE 4 (FOUR) TIMES DAILY AS NEEDED (FOR DRY EYES) FOR UP TO 90 DAYS  fluocinolone acetonide (DERMA SMOOTHE/FS BODY) 0.01 % external oil, Use once every other day for scalp itching. After one month, use once weekly.  ketoconazole (NIZORAL) 2 % external shampoo, Scrub into scalp 2-3 times a week for flaking and itching. After application, wait for five minutes before rinsing thoroughly.  lisinopril-hydrochlorothiazide (ZESTORETIC) 10-12.5 MG tablet, Take 1 tablet by mouth daily  omeprazole (PRILOSEC) 20 MG DR capsule, TAKE 1 CAPSULE BY MOUTH EVERY DAY **DO NOT CRUSH**  tamsulosin (FLOMAX) 0.4 MG capsule,     No current facility-administered medications on file prior to visit.    FHx: No known family history of glaucoma or macular degeneration  PSHx: No ocular surgery    Current  Eye Medications: Artificial tears PRN      Assessment & Plan:  (H40.003) Glaucoma suspect of both eyes  (primary encounter diagnosis)  Gonioscopy: open to CBB, heavily pigmented angle each eye   Ethnicity:   Prior surgical interventions: none    T max: 18/15  C/D: 0.75/0.75  Pachymetry: 610/614  FH of glaucoma: no known  OCT nerve fiber layer 12/07/22:   Right eye - G(y) 79 NI (g) 113 TI (g) 117 NS (y) 60 TS (g) 110      Left eye - G(y) 78 NI (g) 94TI (g) 113 NS (g) 79 TS (y) 101  OCT nerve fiber layer 06/14/23:   Right eye - G(g) 81 NI (g) 124 TI (g) 116 NS (r) 57 TS (g) 111      Left eye - G(y) 78 NI (g) 95 TI (g) 108 NS (g) 78 TS (y) 102    OVF 24-2 (2/8/23):   -right eye: sup and inf arc deficits; FN 20%   -left eye: sup arc, IT deficits; reliable  OVF 24-2 (6/14/23):   -right eye: MD -7.3, nonspecific   -left eye: MD -15.4,severely reduced    Oct retinal nerve fiber layer and intraocular pressure stable  Vision severely reduced today due to cataract  Recommend Cataract extraction/iol left eye     (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 Cataract surgery recommended and expected to improve vision. Vision is not correctable by glasses or other nonsurgical measures. R/B/A were discussed with the patient. These included, but are not limited to: bleeding, infection, loss of vision, loss of the eye, need for more surgery, glaucoma, retinal detachment, need for glasses, corneal edema. The patient voiced understanding and wishes to  proceed. All lens options were discussed with the patient including monofocal lenses, multifocal lenses, and toric lenses. The risks and benefits of each were discussed, including halos, glare, and possible need for glasses. No guarantees about vision after surgery were given. The patient voiced understanding and wishes to proceed    Proceed with CE/IOL OS.          (H04.123) Dry eyes, bilateral  (H02.883,  H02.886) Meibomian gland dysfunction (MGD) of both eyes  Comment: Already using AT  Plan: increase using AT to four times a day  martín, warm compresses as needed      (H52.03,  H52.203,  H52.4) Hyperopia of both eyes with astigmatism and presbyopia  Hold pending Cataract extraction/iol     Return for POD0.      Attending Physician Attestation:  Complete documentation of historical and exam elements from today's encounter can be found in the full encounter summary report (not reduplicated in this progress note).  I personally obtained the chief complaint(s) and history of present illness.  I confirmed and edited as necessary the review of systems, past medical/surgical history, family history, social history, and examination findings as documented by others; and I examined the patient myself.  I personally reviewed the relevant tests, images, and reports as documented above.  I formulated and edited as necessary the assessment and plan and discussed the findings and management plan with the patient and family. - Lawrence Coffey MD

## 2023-06-16 NOTE — PROGRESS NOTES
Mr. Jaimes is scheduled to have a colonoscopy on Monday.    I have messaged the [facility] staff to have him come in that today to get labs drawn (including a HCV genotype), and then schedule a RTC with me to discuss HCV treatment.

## 2023-06-19 NOTE — H&P
ENDOSCOPY PRE-SEDATION H&P FOR OUTPATIENT PROCEDURES    Kristian HILL  [id number]  1955    Procedure: Colonoscopy    Pre-procedure diagnosis: screening    Past medical history: No past medical history on file.  Patient Active Problem List   Diagnosis     Acute cholecystitis     Alcohol abuse     BPH with obstruction/lower urinary tract symptoms     Chronic hepatitis C without hepatic coma (H)     Latent tuberculosis infection     Primary hypertension       Past surgical history: History reviewed. No pertinent surgical history.    No current facility-administered medications for this encounter.       No Known Allergies        Physical Exam:    Mental status: alert  Heart: Normal  Lung: Normal  Assessment of patient's airway: Normal  Other as pertinent for procedure: None       Lab Results   Component Value Date    WBC 4.3 06/19/2023     Lab Results   Component Value Date    RBC 5.66 06/19/2023     Lab Results   Component Value Date    HGB 16.9 06/19/2023     Lab Results   Component Value Date    HCT 49.8 06/19/2023     Lab Results   Component Value Date    MCV 88 06/19/2023     Lab Results   Component Value Date    MCH 29.9 06/19/2023     Lab Results   Component Value Date    MCHC 33.9 06/19/2023     Lab Results   Component Value Date    RDW 12.3 06/19/2023     Lab Results   Component Value Date     06/19/2023     INR   Date Value Ref Range Status   01/18/2023 1.01 0.85 - 1.15 Final        ASA Score: See Provation note    Mallampati score:  II - Faucial pillars and soft palate may be seen, but uvula is masked by the base of the tongue    Assessment/Plan:     The patient is an appropriate candidate to receive sedation.    Informed consent was discussed with the patient/family, including the risks, benefits, potential complications and any alternative options associated with sedation.    Patient assessment completed just prior to sedation and while under constant observation by the provider. Condition  determined to be adequate for proceeding with sedation.    The specific risks for the procedure were discussed with the patient at the time of informed consent and include but are not limited to perforation which could require surgery, missing significant neoplasm or lesion, hemorrhage and adverse sedative complication.      Darryl Fisher MD

## 2023-06-20 PROBLEM — H25.813 COMBINED FORMS OF AGE-RELATED CATARACT OF BOTH EYES: Status: ACTIVE | Noted: 2023-01-01

## 2023-06-20 NOTE — TELEPHONE ENCOUNTER
Called patient to schedule surgery with Dr. Coffey    Date of Surgery: 8/31    Location of surgery: [facility]    Pre-Op H&P: PCP    Pre/Post Imaging:  No    Post-Op Appt Date: 9/6,9/27    Surgery Packet Mailed: yes    Additional comments: Spoke with patient, they are aware of above date, will review packet and reach out with any questions        Anna C. Schoenecker on 6/20/2023 at 11:56 AM

## 2023-06-22 NOTE — RESULT ENCOUNTER NOTE
"Mr. HILL -     I am writing to let you know the results of the two small polyps that were removed at the time of your colonoscopy.  The polyps returned as  \"adenomatous polyps\".  An adenoma is a type of benign polyp. If left in place for years, adenomas have a small risk of developing cancer.  There was no cancer in your polyps.  Your polyps were completely removed, but you are at risk to grow more adenomatous polyps in the future.      Based on your findings, current gastroenterology societies recommend a colonoscopy to look for new polyps in 7 to 10 years.  I suggest that you discuss this with your primary care provider(s) at that time.    If you have any questions, please don't hesitate to contact the Gastroenterology nurse at [phone].       Darryl Fisher MD  Professor of Medicine  Division of Gastroenterology, Hepatology, and Nutrition  [facility] "

## 2023-06-30 NOTE — TELEPHONE ENCOUNTER
+ interp LVM // pt needs to reschedule appt with Dr. Fisher on 10.2.23 to be sooner - please manually reschedule into any New Liver slot on 7.13.23 or 7.20.23 with Dr. Fisher, with labs prior - okay'd by provider // first attempt, AN 6.30.23

## 2023-07-10 NOTE — TELEPHONE ENCOUNTER
+ interp unable to LVM & sent letter // pt needs to reschedule the now cancelled appt with Dr. Fisher on 10.2.23, next available, with labs prior // second attempt, AN 7.10.23

## 2023-08-25 NOTE — TELEPHONE ENCOUNTER
FUTURE VISIT INFORMATION      SURGERY INFORMATION:  Date: 23  Location:  or  Surgeon:  Lawrence Coffey MD   Anesthesia Type:  mac with topical  Procedure: LEFT EYE PHACOEMULSIFICATION, CATARACT, WITH INTRAOCULAR LENS IMPLANT   Consult: ov     RECORDS REQUESTED FROM:       Primary Care Provider: faviolalavon    Pertinent Medical History: hypertension    Most recent EKG+ Tracin22- [facility]

## 2023-08-29 NOTE — PATIENT INSTRUCTIONS
Preparing for Your Surgery      Name:  Kristian HILL   MRN:  [id number]   :  1955   Today's Date:  2023         Arriving for surgery:  Surgery date:  23  Arrival time:  5:45 am    Restrictions due to COVID 19:    Please maintain social distance.  Masking is optional        parking is available for anyone with mobility limitations or disabilities. (Monday- Friday 7 am- 5 pm)    Please come to:    [facility]  [address] [place] [id number]    Check in on the 5th floor, Ambulatory Surgery Center.    What can I eat or drink?    -  You may eat and drink normally until 8 hours before arrival time  (Until 9:45 pm)  -  You may have clear liquids until 2 hours before arrival time  (Until 3:45 am)    Examples of clear liquids:  Water  Clear broth  Juices (apple, white grape, white cranberry  and cider) without pulp  Noncarbonated, powder based beverages  (lemonade and Taran-Aid)  Sodas (Sprite, 7-Up, ginger ale and seltzer)  Coffee or tea (without milk or cream)  Gatorade    --No alcohol or cannabis products for at least 24 hours before surgery    Which medicines can I take?    Hold Aspirin for 7 days before surgery.   Hold Multivitamins for 7 days before surgery.  Hold Supplements for 7 days before surgery.  Hold Ibuprofen (Advil, Motrin) for 1 day before surgery--unless otherwise directed by surgeon.  Hold Naproxen (Aleve) for 4 days before surgery.    -  DO NOT take the following medications the day of surgery:  Not applicable    -  PLEASE TAKE the following medications the day of surgery   Eye drops per surgeon's instructions  Lisinopril-hydrochlorothiazide (Zestoretic)  Omeprazole (Prilosec)  Tamsulosin (Flomax)    How do I prepare myself?  - Please take 2 showers (one the night prior to surgery and one the morning of surgery) using Scrubcare or Hibiclens soap.    Use this soap only from the neck to your toes.     Leave the soap on your skin for one  minute--then rinse thoroughly.      You may use your own shampoo and conditioner; no other hair products.   - Please remove all jewelry and body piercings.  - No lotions, deodorants or fragrance.  - No makeup or fingernail polish.   - Bring your ID and insurance card.    -If you have a Deep Brain Stimulator, a Spinal Cord Stimulator or any implanted Neuro device you must bring the remote to the Surgery Center          ALL PATIENTS ARE REQUIRED TO HAVE A RESPONSIBLE ADULT TO DRIVE AND BE IN ATTENDANCE WITH THEM FOR 24 HOURS FOLLOWING SURGERY       Covid testing policy as of 12/06/2022  Your surgeon will notify and schedule you for a COVID test if one is needed before surgery--please direct any questions or COVID symptoms to your surgeon      Questions or Concerns:    -For questions regarding the day of surgery please contact the Ambulatory Surgery Center at [phone].    -If you have health changes between today and your surgery please contact your surgeon.     For questions after surgery please call your surgeons office.

## 2023-08-29 NOTE — H&P
Pre-Operative H & P     CC:  Preoperative exam to assess for increased cardiopulmonary risk while undergoing surgery and anesthesia.    Date of Encounter: 8/29/2023  Primary Care Physician:  No Ref-Primary, Physician     Reason for visit:   Encounter Diagnosis   Name Primary?    Pre-op evaluation Yes       HPI  Kristian HILL is a 68 year old male who presents for pre-operative H & P in preparation for  Procedure Information       Case: [id number] Date/Time: 08/31/23 0715    Procedure: LEFT EYE PHACOEMULSIFICATION, CATARACT, WITH INTRAOCULAR LENS IMPLANT (Left: Eye)    Anesthesia type: MAC with Topical    Diagnosis: Combined forms of age-related cataract of both eyes [H25.813]    Pre-op diagnosis: Combined forms of age-related cataract of both eyes [H25.813]    Location: [place] [address] / [facility]    Providers: Lawrence Coffey MD            Patient is being evaluated for comorbid conditions of hypertension, tobacco use, obesity, HCV, GERD, BPH    Mr. Hill has a history of blurry vision of the left eye. He has been following with Dr. Coffey. He is now scheduled for cataract procedure as above.     History is obtained from the patient and chart review.  was available via phone     Hx of abnormal bleeding or anti-platelet use: denies      Past Medical History  No past medical history on file.    Past Surgical History  Past Surgical History:   Procedure Laterality Date    COLONOSCOPY N/A 06/19/2023    Procedure: COLONOSCOPY, FLEXIBLE, WITH LESION REMOVAL USING SNARE;  Surgeon: Darryl Fisher MD;  Location:  GI    CYSTOSCOPY         Prior to Admission Medications  Current Outpatient Medications   Medication Sig Dispense Refill    ARTIFICIAL TEARS 1 % ophthalmic solution APPLY 1 DROP TO EYE 4 (FOUR) TIMES DAILY AS NEEDED (FOR DRY EYES) FOR UP TO 90 DAYS      fluocinolone acetonide (DERMA SMOOTHE/FS BODY) 0.01 % external oil Use once  every other day for scalp itching. After one month, use once weekly. 118.28 mL 1    ketoconazole (NIZORAL) 2 % external shampoo Scrub into scalp 2-3 times a week for flaking and itching. After application, wait for five minutes before rinsing thoroughly. 120 mL 11    lisinopril-hydrochlorothiazide (ZESTORETIC) 10-12.5 MG tablet Take 1 tablet by mouth every morning      omeprazole (PRILOSEC) 20 MG DR capsule 20 mg daily      tamsulosin (FLOMAX) 0.4 MG capsule 0.4 mg daily         Allergies  No Known Allergies    Social History  Social History     Socioeconomic History    Marital status:      Spouse name: Not on file    Number of children: Not on file    Years of education: Not on file    Highest education level: Not on file   Occupational History    Not on file   Tobacco Use    Smoking status: Some Days     Types: Cigarettes     Passive exposure: Current    Smokeless tobacco: Never   Substance and Sexual Activity    Alcohol use: Not Currently    Drug use: Yes     Types: Marijuana     Comment: smoke daily    Sexual activity: Not on file   Other Topics Concern    Not on file   Social History Narrative    Not on file     Social Determinants of Health     Financial Resource Strain: Not on file   Food Insecurity: Not on file   Transportation Needs: Not on file   Physical Activity: Not on file   Stress: Not on file   Social Connections: Not on file   Intimate Partner Violence: Not on file   Housing Stability: Not on file       Family History  Family History   Problem Relation Age of Onset    Anesthesia Reaction No family hx of     Deep Vein Thrombosis (DVT) No family hx of        Review of Systems  The complete review of systems is negative other than noted in the HPI or here.   Anesthesia Evaluation   Pt has had prior anesthetic.     No history of anesthetic complications       ROS/MED HX  ENT/Pulmonary:     (+)     PAUL risk factors, snores loudly, hypertension,         tobacco use, Current use,                     "  Neurologic:  - neg neurologic ROS  (-) no seizures and no CVA   Cardiovascular:     (+)  hypertension- -   -  - -                                 Previous cardiac testing   Echo: Date: Results:    Stress Test:  Date: Results:    ECG Reviewed:  Date: 4/2022 Results:  Sinus bradycardia, moderate t wave abnormality  Cath:  Date: Results:   (-) taking anticoagulants/antiplatelets   METS/Exercise Tolerance: >4 METS Comment: Can bike long distances, work is active   Hematologic:  - neg hematologic  ROS  (-) history of blood clots and history of blood transfusion   Musculoskeletal:  - neg musculoskeletal ROS     GI/Hepatic:     (+) GERD, Asymptomatic on medication,         hepatitis type C,        Renal/Genitourinary:     (+)        BPH,      Endo:     (+)               Obesity (BMI 33),    (-) chronic steroid usage   Psychiatric/Substance Use:  - neg psychiatric ROS  (-) psychiatric history   Infectious Disease:  - neg infectious disease ROS     Malignancy:  - neg malignancy ROS     Other:            /71 (BP Location: Right arm, Patient Position: Sitting, Cuff Size: Adult Large)   Pulse 56   Temp 98.4  F (36.9  C) (Oral)   Ht 1.6 m (5' 3\")   Wt 83.9 kg (185 lb)   SpO2 97%   BMI 32.77 kg/m      Physical Exam   Constitutional: Awake, alert, cooperative, no apparent distress, and appears stated age.  Eyes: Pupils equal, round and reactive to light, extra ocular muscles intact, sclera clear, conjunctiva normal.  HENT: Normocephalic, oral pharynx with moist mucus membranes, upper dentures  Respiratory: Clear to auscultation bilaterally, no crackles or wheezing.  Cardiovascular: Regular rate and rhythm, normal S1 and S2, and no murmur noted.  Carotids no bruits. No edema. Palpable pulses to radial arteries.   GI: Normal bowel sounds, soft, non-distended, non-tender  Genitourinary:  deferred  Skin: Warm and dry.  No rashes at anticipated surgical site.   Musculoskeletal: Full ROM of neck. There is no redness, " warmth, or swelling of the exposed joints. Gross motor strength is normal.    Neurologic: Awake, alert, oriented to name, place and time. Cranial nerves II-XII are grossly intact.  Neuropsychiatric: Calm, cooperative. Normal affect.     Prior Labs/Diagnostic Studies   All labs and imaging personally reviewed     EKG/ stress test - if available please see in ROS above   No results found.       No data to display                  The patient's records and results personally reviewed by this provider.     Outside records reviewed from: Care Everywhere    LAB/DIAGNOSTIC STUDIES TODAY: none    Assessment    Kristian HILL is a 68 year old male seen as a PAC referral for risk assessment and optimization for anesthesia.    Plan/Recommendations  Pt will be optimized for the proposed procedure.  See below for details on the assessment, risk, and preoperative recommendations    NEUROLOGY  - No history of TIA, CVA or seizure  -Post Op delirium risk factors:  No risk identified    ENT  - No current airway concerns.  Will need to be reassessed day of surgery.  Mallampati: III  TM: > 3    CARDIAC  - No history of CAD and Afib  -hypertension using Zestoretic  -denies cardiac symptoms    - METS (Metabolic Equivalents)  Patient performs 4 or more METS exercise without symptoms            Total Score: 0      RCRI-Very low risk: Class 1 0.4% complication rate            Total Score: 0        PULMONARY  PAUL Medium Risk            Total Score: 4    PAUL: Snores loudly    PAUL: Hypertension    PAUL: Over 50 ys old    PAUL: Male      - Denies asthma or inhaler use  - Tobacco History    History   Smoking Status    Some Days    Types: Cigarettes   Smokeless Tobacco    Never       GI  - GERD  Controlled on medications: Proton Pump Inhibitor  PONV Low Risk  Total Score: 1           1 AN PONV: Intended Post Op Opioids        /RENAL  - Baseline Creatinine WNL  -BPH using flomax    ENDOCRINE    - BMI: Estimated body mass index is 32.77 kg/m  as  "calculated from the following:    Height as of this encounter: 1.6 m (5' 3\").    Weight as of this encounter: 83.9 kg (185 lb).  Obesity (BMI >30)  - No history of Diabetes Mellitus    HEME  VTE Low Risk 0.5%            Total Score: 3    VTE: Greater than 59 yrs old    VTE: Male      - No history of abnormal bleeding or antiplatelet use.    OPHTHALMOLOGY  -cataract with the above procedure planned    Different anesthesia methods/types have been discussed with the patient, but they are aware that the final plan will be decided by the assigned anesthesia provider on the date of service.    The patient is optimized for their procedure. AVS with information on surgery time/arrival time, meds and NPO status given by nursing staff. No further diagnostic testing indicated.      On the day of service:     Prep time: 8 minutes  Visit time: 9 minutes  Documentation time: 6 minutes  ------------------------------------------  Total time: 23 minutes      Thelma Hutchison PA-C  Preoperative Assessment Center  [facility]  [facility] and [facility] Center  Phone: [phone]  Fax: [phone]    "

## 2023-08-29 NOTE — PROGRESS NOTES
Social Work - Intervention  [facility]  Data/Intervention:    Patient Name: Kristian HILL Goes By: Kristian    /Age: 1955 (68 year old)     Visit Type: telephone  Referral Source: PAC/Eye Clinic  Reason for Referral: Transportation    Collaborated With:    -COCO Rodriguez  -MAYELA Renee  -Yvette with [facility]- [phone]  -Kristian- [phone]  -     Psychosocial Information/Concerns:  Received message from COCO Ca that Kristian is having cataract surgery on  and does not have any one to accompany him home and she is wondering if there are resources for Kristian. Lanny noted Kristian will be checking with his neighbors about this but doesn't really know anyone.     Per MAYELA Renee Kristian will be getting anaesthesia, needs medical transport, and a caregiver at home afterwards.     Will be receiving anaesthesia and needs medical transport and a caregiver afterwards     Intervention/Education/Resources Provided:  I contacted Kristian with the assistance of a  and the phone went straight to voicemail. Will make contact attempt again later today.   I contacted Kristian again at 12:05pm with assistance of - phone went straight to voicemail again and message was left introducing myself and the reason for my call and I asked for a return call.     I contacted [facility] and spoke with Yvette and explained the need for [facility] transportation. Yvette took my information and Kristian's and provided a case number of [id number] and noted I will be hearing back from Stefanie to complete an assessment. Yvette reported that if STS is approved I should indicate it is for ongoing needs so Kristian doesn't have to worry about renewing things. Per Yvette if STS is approved, [facility] doesn't dictate the transportation company.     I spoke with Stefanie with [facility] at 12:10pm and I explained Kristian's upcoming procedure with sedation and his need for STS. Per Stefanie STS is  approved for Kristian from 8/29/23-10/31/23. Stefanie said she will contact Kristian in a couple weeks to see how he's doing and if he wants STS extended.   Per Stefanie- STS is approved as of today.     I updated Lanny and Thelma that I am unable to reach Kristian and also explained that even if Kristian has the means to private pay to hire help, a two day turn around may not be enough time to get services in place. I asked if there is a nurse that I can update that he may not meet the caregiver criteria for the procedure.      Assessment/Plan:  I will await a call back from Kristian.     Provided patient/family with contact information and availability.    LJ Davis, [facility]    [facility]  Ph: [phone], Pgr: [phone]  8/29/2023

## 2023-08-30 NOTE — PROGRESS NOTES
Social Work - Follow-Up  [facility]    Data/Intervention:    Patient Name: Kristian HILL Goes By: Kristian    /Age: 1955 (68 year old)    Reason for Follow-Up:  Transportation    Collaborated With:    -Chantelle MORRELL with Eye Clinic scheduling  -Kristian- [phone]  -    Intervention/Education/Resources Provided:  I coordinated with Chantelle with scheduling and she reported she was told by Dr. Coffey that Kristian now has someone to be a caregiver after his surgery tomorrow. Chantelle did not know if Kristian is needing help with transportation.     I contacted Kristian with assistance of a . Kristian confirmed he has someone to be with him at home after his surgery. He also confirmed he has a ride to his procedure and will be taking a cab home afterwards and his caregiver will be in the cab with him.     Kristian did not have any further questions or needs at this time.     Assessment/Plan:  No further follow up is planned at this time as all needs have been addressed at this time.         LJ Davis, [facility]    [facility]  Ph: [phone], Pgr: [phone]  2023

## 2023-08-30 NOTE — ANESTHESIA PREPROCEDURE EVALUATION
Anesthesia Pre-Procedure Evaluation    Patient: Kristian HILL   MRN: [id number] : 1955        Procedure : Procedure(s):  LEFT EYE PHACOEMULSIFICATION, CATARACT, WITH INTRAOCULAR LENS IMPLANT          No past medical history on file.   Past Surgical History:   Procedure Laterality Date    COLONOSCOPY N/A 2023    Procedure: COLONOSCOPY, FLEXIBLE, WITH LESION REMOVAL USING SNARE;  Surgeon: Darryl Fisher MD;  Location: [facility]    CYSTOSCOPY        No Known Allergies   Social History     Tobacco Use    Smoking status: Some Days     Types: Cigarettes     Passive exposure: Current    Smokeless tobacco: Never   Substance Use Topics    Alcohol use: Not Currently      Wt Readings from Last 1 Encounters:   23 83.9 kg (185 lb)        Anesthesia Evaluation   Pt has had prior anesthetic.     No history of anesthetic complications       ROS/MED HX  ENT/Pulmonary: Comment: Combined forms of age-related cataract of both eyes      Neurologic:  - neg neurologic ROS     Cardiovascular:     (+)  hypertension- -   -  - -                                      METS/Exercise Tolerance: >4 METS    Hematologic:  - neg hematologic  ROS     Musculoskeletal:  - neg musculoskeletal ROS     GI/Hepatic:     (+)          cholecystitis/cholelithiasis, hepatitis type C, liver disease,       Renal/Genitourinary:  - neg Renal ROS     Endo:  - neg endo ROS     Psychiatric/Substance Use:  - neg psychiatric ROS     Infectious Disease:     (+)      TB (latent),      Malignancy:  - neg malignancy ROS     Other:  - neg other ROS          Physical Exam    Airway  airway exam normal      Mallampati: I   TM distance: > 3 FB   Neck ROM: full   Mouth opening: > 3 cm    Respiratory Devices and Support         Dental       (+) Completely normal teeth      Cardiovascular   cardiovascular exam normal       Rhythm and rate: regular and normal     Pulmonary   pulmonary exam normal        breath sounds clear to auscultation           OUTSIDE  LABS:  CBC:   Lab Results   Component Value Date    WBC 4.3 06/19/2023    HGB 16.9 06/19/2023    HCT 49.8 06/19/2023     06/19/2023     BMP:   Lab Results   Component Value Date     01/18/2023     01/04/2023    POTASSIUM 3.8 01/18/2023    POTASSIUM 3.4 01/04/2023    CHLORIDE 103 01/18/2023    CHLORIDE 102 01/04/2023    CO2 22 01/18/2023    CO2 20 (L) 01/04/2023    BUN 11.3 01/18/2023    BUN 15.4 01/04/2023    CR 0.82 01/18/2023    CR 0.73 01/04/2023     (H) 01/18/2023    GLC 90 01/04/2023     COAGS:   Lab Results   Component Value Date    INR 1.01 01/18/2023     POC: No results found for: BGM, HCG, HCGS  HEPATIC:   Lab Results   Component Value Date    ALBUMIN 4.3 01/04/2023    PROTTOTAL 8.4 (H) 01/04/2023     (H) 01/04/2023    AST 66 (H) 01/04/2023    ALKPHOS 80 01/04/2023    BILITOTAL 0.7 01/04/2023     OTHER:   Lab Results   Component Value Date    FELI 9.0 01/18/2023    TSH 3.30 01/18/2023       Anesthesia Plan    ASA Status:  3    NPO Status:  NPO Appropriate    Anesthesia Type: MAC.     - Reason for MAC: Deep or markedly invasive procedure (G8)   Induction: Propofol.   Maintenance: N/A.        Consents    Anesthesia Plan(s) and associated risks, benefits, and realistic alternatives discussed. Questions answered and patient/representative(s) expressed understanding.     - Discussed:     - Discussed with:  Patient       Use of blood products discussed: No .     Postoperative Care    Pain management: Multi-modal analgesia, Oral pain medications.   PONV prophylaxis: Ondansetron (or other 5HT-3), Dexamethasone or Solumedrol     Comments:                Elmer Shaffer DO

## 2023-08-30 NOTE — PROGRESS NOTES
Chief Complaint(s) and History of Present Illness(es)    No visit information to display     Review of systems for the eyes was negative other than the pertinent positives/negatives listed in the HPI.      Assessment & Plan      Kristian HILL is a 68 year old male with the following diagnoses:   1. Postoperative eye state         PostOp, left eye, day 0  Surgery notes: RBB, lens in bag  Post op notes: paralytic ptosis and ophthalmoplegia, left patch in place until tomorrow, sent drops to [facility] pharmacy per patient request    Doing well  Keep patch in place at night for 5 days  Start post-operative drops and taper according to instructions  Post-operative do's and don'ts reviewed, questions answered    Patient disposition:   Follow up as scheduled.     Kaylah Serrano MD  PGY-4  Ophthalmology   [facility]      Attending Physician Attestation:  Complete documentation of historical and exam elements from today's encounter can be found in the full encounter summary report (not reduplicated in this progress note).  I personally obtained the chief complaint(s) and history of present illness.  I confirmed and edited as necessary the review of systems, past medical/surgical history, family history, social history, and examination findings as documented by others; and I examined the patient myself.  I personally reviewed the relevant tests, images, and reports as documented above.  I formulated and edited as necessary the assessment and plan and discussed the findings and management plan with the patient and family.  - Lawrence Coffey MD

## 2023-08-31 NOTE — DISCHARGE INSTRUCTIONS
"[facility]  Home Care Following Anesthesia  For 24 hours after surgery:  Get plenty of rest.  A responsible adult must stay with you for at least 24 hours after you leave the surgery center.  Do not drive or use heavy equipment.  If you have weakness or tingling, don't drive or use heavy equipment until this feeling goes away.   Do not drink alcohol.   Avoid strenuous or risky activities.  Ask for help when climbing stairs.  You may feel lightheaded.  IF so, sit for a few minutes before standing.  Have someone help you get up.   If you have nausea (feel sick to your stomach): Drink only clear liquids such as apple juice, ginger ale, broth or 7-Up.  Rest may also help.  Be sure to drink enough fluids.  Move to a regular diet as you feel able.   You may have a slight fever.  Call the doctor if your fever is over 100 F (37.7 C) (taken under the tongue) or lasts longer than 24 hours.  You may have a dry mouth, a sore throat, muscle aches or trouble sleeping. These should go away after 24 hours.  Do not make important or legal decisions.   It is recommended to avoid smoking.        Today you received a Marcaine or bupivacaine block to numb the nerves near your surgery site.  This is a block using local anesthetic or \"numbing\" medication injected around the nerves to anesthetize or \"numb\" the area supplied by those nerves.  This block is injected into the muscle layer near your surgical site.  The medication may numb the location where you had surgery for 6-18 hours, but may last up to 24 hours.  If your surgical site is an arm or leg you should be careful with your affected limb, since it is possible to injure your limb without being aware of it due to the numbing.  Until full feeling returns, you should guard against bumping or hitting your limb, and avoid extreme hot or cold temperatures on the skin.  As the block wears off, the feeling will return as a tingling or prickly " sensation near your surgical site.  You will experience more discomfort from your incision as the feeling returns.  You may want to take a pain pill (a narcotic or Tylenol if this was prescribed by your surgeon) when you start to experience mild pain before the pain beccomes more severe.  If your pain medications do not control your pain you should notifiy your surgeon.    Tips for taking pain medications  To get the best pain relief possible, remember these points:  Take pain medications as directed, before pain becomes severe.  Pain medication can upset your stomach: taking it with food may help.  Constipation is a common side effect of pain medication. Drink plenty of  fluids.  Eat foods high in fiber. Take a stool softener if recommended by your doctor or pharmacist.  Do not drink alcohol, drive or operate machinery while taking pain medications.  Ask about other ways to control pain, such as with heat, ice or relaxation.    Tylenol/Acetaminophen Consumption    If you feel your pain relief is insufficient, you may take Tylenol/Acetaminophen in addition to your narcotic pain medication.   Be careful not to exceed 4,000 mg of Tylenol/Acetaminophen in a 24 hour period from all sources.  If you are taking extra strength Tylenol/acetaminophen (500 mg), the maximum dose is 8 tablets in 24 hours.  If you are taking regular strength acetaminophen (325 mg), the maximum dose is 12 tablets in 24 hours.    Call a doctor for any of the following:  Signs of infection (fever, growing tenderness at the surgery site, a large amount of drainage or bleeding, severe pain, foul-smelling drainage, redness, swelling).  It has been over 8 to 10 hours since surgery and you are still not able to urinate (pass water).  Headache for over 24 hours.  Numbness, tingling or weakness the day after surgery (if you had spinal anesthesia).  Signs of Covid-19 infection (temperature over 100 degrees, shortness of breath, cough, loss of taste/smell,  generalized body aches, persistent headache, chills, sore throat, nausea/vomiting/diarrhea)  Your doctor is:  Dr. Lawrence Coffey, Ophthalmology: [phone]                    Or dial [phone] and ask for the resident on call for:  Ophthalmology  For emergency care, call the:  [facility]:  [phone] (TTY for hearing impaired: [phone])

## 2023-08-31 NOTE — ANESTHESIA CARE TRANSFER NOTE
Patient: Kristian HILL    Procedure: Procedure(s):  LEFT EYE PHACOEMULSIFICATION, CATARACT, WITH INTRAOCULAR LENS IMPLANT       Diagnosis: Combined forms of age-related cataract of both eyes [H25.813]  Diagnosis Additional Information: No value filed.    Anesthesia Type:   MAC     Note:    Oropharynx: oropharynx clear of all foreign objects  Level of Consciousness: awake  Oxygen Supplementation: room air    Independent Airway: airway patency satisfactory and stable  Dentition: dentition unchanged  Vital Signs Stable: post-procedure vital signs reviewed and stable  Report to RN Given: handoff report given  Patient transferred to: Phase II  Comments: VSS and WNL, comfortable, no PONV, report to Ramiro SEPULVEDA  Handoff Report: Identifed the Patient, Identified the Reponsible Provider, Reviewed the pertinent medical history, Discussed the surgical course, Reviewed Intra-OP anesthesia mangement and issues during anesthesia, Set expectations for post-procedure period and Allowed opportunity for questions and acknowledgement of understanding      Vitals:  Vitals Value Taken Time   /64 08/31/23 1026   Temp 36.1  C (96.9  F) 08/31/23 1026   Pulse 51 08/31/23 1026   Resp 14 08/31/23 1026   SpO2 99 % 08/31/23 1026       Electronically Signed By: CHING Sanchez CRNA  August 31, 2023  10:39 AM

## 2023-08-31 NOTE — ANESTHESIA POSTPROCEDURE EVALUATION
Patient: Kristian HILL    Procedure: Procedure(s):  LEFT EYE PHACOEMULSIFICATION, CATARACT, WITH INTRAOCULAR LENS IMPLANT       Anesthesia Type:  MAC    Note:  Disposition: Outpatient   Postop Pain Control: Uneventful            Sign Out: Well controlled pain   PONV: No   Neuro/Psych: Uneventful            Sign Out: Acceptable/Baseline neuro status   Airway/Respiratory: Uneventful            Sign Out: Acceptable/Baseline resp. status   CV/Hemodynamics: Uneventful            Sign Out: Acceptable CV status; No obvious hypovolemia; No obvious fluid overload   Other NRE: NONE   DID A NON-ROUTINE EVENT OCCUR? No           Last vitals:  Vitals Value Taken Time   /64 08/31/23 1026   Temp 36.1  C (96.9  F) 08/31/23 1026   Pulse 51 08/31/23 1026   Resp 14 08/31/23 1026   SpO2 99 % 08/31/23 1026       Electronically Signed By: Elmer Shaffer DO  August 31, 2023  12:39 PM

## 2023-08-31 NOTE — OP NOTE
PREOPERATIVE DIAGNOSIS:   1. Combined form of age-related cataract, left eye    2. Combined forms of age-related cataract of both eyes     Left eye   POSTOPERATIVE DIAGNOSIS: Same   PROCEDURES:   1. Cataract extraction with intraocular lens implant Left eye.  SURGEON: Lawrence Coffey M.D.  ASSISTANT: Kaylah Serrano MD  INDICATIONS: The patient Kristian HILL presented to the eye clinic with decreased vision secondary to cataract in the Left eye. The risks, benefits and alternatives to cataract extraction were discussed. The patient elected to proceed. All questions were answered to the patient's satisfaction.   DESCRIPTION OF PROCEDURE: Prior to the procedure, appropriate cardiac and respiratory monitors were applied to the patient.  The patient was brought to the operating room where a surgical pause was carried out to identify with all members of the surgical team the correct surgical site.  Under monitored anesthesia care, a retrobulbar injection of 2% lidocaine with hyaluronidase was given.With adequate anesthesia and akinesia, the Left eye was prepped and draped in the usual sterile fashion. A lid speculum was placed, and the operating microscope was rotated into position. A paracentesis was created.  Through this limbal paracentesis, the anterior chamber was filled with Trypan blue for 30 seconds, followed by epi-shugarcaine. The eye was inflated with dispersive viscoelastic. A temporal wound was created at the limbus using a 2.6 mm blade. A capsulorrhexis was initiated using a bent 25-gauge needle and was completed in continuous and circular fashion using the capsulorrhexis forceps. The lens nucleus was hydrodissected using balanced salt solution.  The lens nucleus was rotated and removed using phacoemulsification in a divide and conquer technique.  Residual cortical material was removed using irrigation-aspiration.  The capsular bag was reinflated to its maximal extent with cohesive viscoelastic.  A 24.0  diopter [id number] inserted into the capsular bag.  The lens power selected was reviewed using the intraocular lens power measurements that were obtained preoperatively to confirm that the correct lens was selected for the desired post-operative refractive state. The residual viscoelastic was removed in its entirety, the wound were hydrated and found to be self-sealing.  Intracameral moxifloxacin was administered. Tactile pressure was confirmed to be in a normal range. Cefazolin/dexamethasone were injected into the inferior fornix.  The lid speculum was removed and a patch and shield were applied.  The patient tolerated the procedure well, and there were no complications.    PLAN: The patient will be discharged to home and will follow up today  EBL:  None  Complications:  None  Implant Name Type Inv. Item Serial No.  Lot No. LRB No. Used Action   LENS [id number] [employer] UV ASPHERIC BICONVEX IOL - [id number] Lens/Eye Implant LENS [id number] [employer] UV ASPHERIC BICONVEX IOL [id number] [employer]  Left 1 Implanted     Attending Physician Procedure Attestation: I was present for the entire procedure and was available to assist as needed-Lawrence Coffey MD

## 2023-09-06 NOTE — PROGRESS NOTES
HPI       Post Op (Ophthalmology) Left Eye    In left eye.  Since onset it is gradually improving.  Associated symptoms include flashes (left eye in dim lighting, intermittent, minimal).  Negative for eye pain, tearing and floaters.  Treatments tried include eye drops.  Pain was noted as 0/10. Additional comments: Cataract extraction with IOL in the left eye on 08/31/2023                Comments    Patient returns for a one week postoperative left eye exam.  He tells me that his vision is brighter in his left eye since the procedure.      TITO Psoada 9:05 AM  September 6, 2023     History and physical examination completed with assistance of telephone                 Last edited by Lawrence Coffey MD on 9/6/2023  9:49 AM.         Review of systems for the eyes was negative other than the pertinent positives/negatives listed in the HPI.      Assessment & Plan    HPI:  Kristian HILL is a 68 year old male who presents for POW1 Cataract extraction/intraocular lens left eye       POHx: bifocal use  PMHx:   History reviewed. No pertinent past medical history.    Current Medications: fluocinolone acetonide (DERMA SMOOTHE/FS BODY) 0.01 % external oil, Use once every other day for scalp itching. After one month, use once weekly.  ketoconazole (NIZORAL) 2 % external shampoo, Scrub into scalp 2-3 times a week for flaking and itching. After application, wait for five minutes before rinsing thoroughly.  ketorolac (ACULAR) 0.5 % ophthalmic solution, Place 1 drop Into the left eye 4 times daily Start 2 days prior to surgery four times a day, after surgery: Four times a day x 1 week, three times a day x 1 week, twice a day x 1 week, daily x 1 week then stop  lisinopril-hydrochlorothiazide (ZESTORETIC) 10-12.5 MG tablet, Take 1 tablet by mouth every morning  moxifloxacin (VIGAMOX) 0.5 % ophthalmic solution, Place 1 drop Into the left eye 4 times daily Start 2 days prior to surgery four times a day.  After Surgery: four times a day  X 1 week  omeprazole (PRILOSEC) 20 MG DR capsule, 20 mg daily  prednisoLONE acetate (PRED FORTE) 1 % ophthalmic suspension, Place 1-2 drops Into the left eye 4 times daily After surgery: Four times a day x 1 week, three times a day x 1 week, twice a day x 1 week, daily x 1 week then stop  tamsulosin (FLOMAX) 0.4 MG capsule, 0.4 mg daily  ARTIFICIAL TEARS 1 % ophthalmic solution, APPLY 1 DROP TO EYE 4 (FOUR) TIMES DAILY AS NEEDED (FOR DRY EYES) FOR UP TO 90 DAYS    No current facility-administered medications on file prior to visit.    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  (primary encounter diagnosis)  Gonioscopy: open to CBB, heavily pigmented angle each eye   Ethnicity:   Prior surgical interventions: none    T max: 18/15  C/D: 0.75/0.75  Pachymetry: 610/614  FH of glaucoma: no known  OCT nerve fiber layer 12/07/22:   Right eye - G(y) 79 NI (g) 113 TI (g) 117 NS (y) 60 TS (g) 110      Left eye - G(y) 78 NI (g) 94TI (g) 113 NS (g) 79 TS (y) 101  OCT nerve fiber layer 06/14/23:   Right eye - G(g) 81 NI (g) 124 TI (g) 116 NS (r) 57 TS (g) 111      Left eye - G(y) 78 NI (g) 95 TI (g) 108 NS (g) 78 TS (y) 102    OVF 24-2 (2/8/23):   -right eye: sup and inf arc deficits; FN 20%   -left eye: sup arc, IT deficits; reliable  OVF 24-2 (6/14/23):   -right eye: MD -7.3, nonspecific   -left eye: MD -15.4,severely reduced    Oct retinal nerve fiber layer and intraocular pressure stable  Vision severely reduced today due to cataract  Recommend Cataract extraction/iol left eye     (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      Attending Physician Attestation:  Complete documentation of historical and exam elements from today's encounter can be found in the full encounter summary report (not reduplicated in this progress note).  I personally obtained the chief complaint(s) and history of present illness.  I confirmed and edited as necessary the review of systems, past medical/surgical history, family history, social history, and examination findings as documented by others; and I examined the patient myself.  I personally reviewed the relevant tests, images, and reports as documented above.  I formulated and edited as necessary the assessment and plan and discussed the findings and management plan with the patient and family. - Lawrence Coffey MD

## 2023-09-06 NOTE — NURSING NOTE
Chief Complaints and History of Present Illnesses   Patient presents with    Post Op (Ophthalmology) Left Eye     Cataract extraction with IOL in the left eye on 08/31/2023        Chief Complaint(s) and History of Present Illness(es)       Post Op (Ophthalmology) Left Eye              Laterality: left eye    Course: gradually improving    Associated symptoms: flashes (left eye in dim lighting, intermittent, minimal).  Negative for eye pain, tearing and floaters    Treatments tried: eye drops    Pain scale: 0/10    Comments: Cataract extraction with IOL in the left eye on 08/31/2023                 Comments    Patient returns for a one week postoperative left eye exam.  He tells me that his vision is brighter in his left eye since the procedure.      Suzi Lawrence, TITO 9:05 AM  September 6, 2023

## 2023-09-27 NOTE — PROGRESS NOTES
HPI       Post Op (Ophthalmology) Left Eye    In left eye.  Associated symptoms include flashes.  Negative for eye pain and floaters.  Treatments tried include eye drops.             Comments    Here for post op left eye. [facility] and [facility] for right [facility]. Compliant with drops. Occasional flashes upon awakening. No floaters.    James Ward COT 10:42 AM September 27, 2023             Last edited by James Ward on 9/27/2023 10:42 AM.         Review of systems for the eyes was negative other than the pertinent positives/negatives listed in the HPI.      Assessment & Plan    HPI:  Kristian HILL is a 68 year old male who presents for POW4 Cataract extraction/intraocular lens left eye. Now desires surgery right eye due to color difference and decreased vision right eye, glare, and haloes      POHx: bifocal use  PMHx:   No past medical history on file.    Current Medications: ARTIFICIAL TEARS 1 % ophthalmic solution, APPLY 1 DROP TO EYE 4 (FOUR) TIMES DAILY AS NEEDED (FOR DRY EYES) FOR UP TO 90 DAYS  fluocinolone acetonide (DERMA SMOOTHE/FS BODY) 0.01 % external oil, Use once every other day for scalp itching. After one month, use once weekly.  ketoconazole (NIZORAL) 2 % external shampoo, Scrub into scalp 2-3 times a week for flaking and itching. After application, wait for five minutes before rinsing thoroughly.  lisinopril-hydrochlorothiazide (ZESTORETIC) 10-12.5 MG tablet, Take 1 tablet by mouth every morning  omeprazole (PRILOSEC) 20 MG DR capsule, 20 mg daily  tamsulosin (FLOMAX) 0.4 MG capsule, 0.4 mg daily    No current facility-administered medications on file prior to visit.    FHx: No known family history of glaucoma or macular degeneration  PSHx: Cataract extraction/IOL Coffey left eye 8/31/23    Current Eye Medications: Artificial tears PRN        Assessment & Plan:  (Z96.1) Pseudophakia, left eye    Coffey left eye 8/31/23      (H25.811) Combined forms of age-related cataract of right eye    Special equipment/needs:  Eye: left  Anesthesia: RBB  Dilates to: 7mm  Iris expansion:  Possible-flomax use, dark iris, not needed left eye   Pseudoexfoliation: No  Trypan Blue: No  Trauma: No     Able lay to flat: Yes  Blood Thinner: No   Tamsulosin: Yes  DM: No  Guttae: No     Dominant Eye: right     Plan: [place]/-0.50 right eye      Cataract is believed to be significantly contributing to patient's visual impairment. Cataract surgery recommended and expected to improve vision. Vision is not correctable by glasses or other nonsurgical measures. R/B/A were discussed with the patient. These included, but are not limited to: bleeding, infection, loss of vision, loss of the eye, need for more surgery, glaucoma, retinal detachment, need for glasses, corneal edema. The patient voiced understanding and wishes to proceed. All lens options were discussed with the patient including monofocal lenses, multifocal lenses, and toric lenses. The risks and benefits of each were discussed, including halos, glare, and possible need for glasses. No guarantees about vision after surgery were given. The patient voiced understanding and wishes to proceed     Proceed with CE/IOL OD.      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) Glaucoma suspect of both eyes  (primary encounter diagnosis)  Gonioscopy: open to CBB, heavily pigmented angle each eye   Ethnicity:   Prior surgical interventions: none    T max: 18/15  C/D: 0.75/0.75  Pachymetry: 610/614  FH of glaucoma: no known  OCT nerve fiber layer 12/07/22:   Right eye - G(y) 79 NI (g) 113 TI (g) 117 NS (y) 60 TS (g) 110      Left eye - G(y) 78 NI (g) 94TI (g) 113 NS (g) 79 TS (y) 101  OCT nerve fiber layer 06/14/23:   Right eye - G(g) 81 NI (g) 124 TI (g) 116 NS (r) 57 TS (g) 111      Left eye - G(y) 78 NI (g) 95 TI (g) 108 NS (g) 78 TS (y) 102    OVF 24-2 (2/8/23):   -right eye: sup and inf arc deficits; FN 20%   -left eye: sup arc, IT deficits; reliable  OVF 24-2 (6/14/23):   -right eye: MD -7.3,  nonspecific   -left eye: MD -15.4,severely reduced    Oct retinal nerve fiber layer and intraocular pressure stable  Vision severely reduced today due to cataract  Recommend Cataract extraction/iol left eye     (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      Attending Physician Attestation:  Complete documentation of historical and exam elements from today's encounter can be found in the full encounter summary report (not reduplicated in this progress note).  I personally obtained the chief complaint(s) and history of present illness.  I confirmed and edited as necessary the review of systems, past medical/surgical history, family history, social history, and examination findings as documented by others; and I examined the patient myself.  I personally reviewed the relevant tests, images, and reports as documented above.  I formulated and edited as necessary the assessment and plan and discussed the findings and management plan with the patient and family. - Lawrence Coffey MD

## 2023-09-27 NOTE — NURSING NOTE
Chief Complaints and History of Present Illnesses   Patient presents with    Post Op (Ophthalmology) Left Eye     Chief Complaint(s) and History of Present Illness(es)       Post Op (Ophthalmology) Left Eye              Laterality: left eye    Associated symptoms: flashes.  Negative for eye pain and floaters    Treatments tried: eye drops              Comments    Here for post op left eye. [facility] and [facility] for right eye. Compliant with drops. Occasional flashes upon awakening. No floaters.    James Ward COT 10:42 AM September 27, 2023

## 2023-10-02 NOTE — TELEPHONE ENCOUNTER
Called and LVM for patient with  to schedule surgery with Dr. Coffey. Provided direct call back number [phone].      Donna Betancourt on 10/2/23 at 9:48 AM  Senior Perioperative Coordinator   Ph: [phone]

## 2023-10-04 PROBLEM — H25.811 COMBINED FORMS OF AGE-RELATED CATARACT OF RIGHT EYE: Status: ACTIVE | Noted: 2023-01-01

## 2023-10-04 NOTE — TELEPHONE ENCOUNTER
Scheduled surgery for 11/2 in [place] with Dr. Coffey.    H&P with PCP within 30 days of the surgery date. Patient verbalized understanding H&P is needed.    1 week and 4 week post-op scheduled at [facility].    Writer will mail surgery packet.    No further questions/concerns at this time.     Donna Betancourt on 10/4/23 at 11:11 AM  Senior Perioperative Coordinator   Ph: [phone]

## 2023-10-13 NOTE — PROGRESS NOTES
Review of systems for the eyes was negative other than the pertinent positives/negatives listed in the HPI.      Assessment & Plan    HPI:  Kristian HILL is a 68 year old male who presents s/p CEIOL right eye 11/02/23       POHx: bifocal use  PMHx:   No past medical history on file.    Current Medications: ARTIFICIAL TEARS 1 % ophthalmic solution, APPLY 1 DROP TO EYE 4 (FOUR) TIMES DAILY AS NEEDED (FOR DRY EYES) FOR UP TO 90 DAYS  fluocinolone acetonide (DERMA SMOOTHE/FS BODY) 0.01 % external oil, Use once every other day for scalp itching. After one month, use once weekly.  ketoconazole (NIZORAL) 2 % external shampoo, Scrub into scalp 2-3 times a week for flaking and itching. After application, wait for five minutes before rinsing thoroughly.  ketorolac (ACULAR) 0.5 % ophthalmic solution, Place 1 drop into the right eye 4 times daily Start 2 days prior to surgery four times a day, after surgery: Four times a day x 1 week, three times a day x 1 week, twice a day x 1 week, daily x 1 week then stop  ketorolac (ACULAR) 0.5 % ophthalmic solution, Place 1 drop into the right eye 4 times daily Start 2 days prior to surgery four times a day, after surgery: Four times a day x 1 week, three times a day x 1 week, twice a day x 1 week, daily x 1 week then stop  lisinopril-hydrochlorothiazide (ZESTORETIC) 10-12.5 MG tablet, Take 1 tablet by mouth every morning  moxifloxacin (VIGAMOX) 0.5 % ophthalmic solution, Place 1 drop into the right eye 4 times daily Start 2 days prior to surgery four times a day, after surgery: Four times a day x 1 week  moxifloxacin (VIGAMOX) 0.5 % ophthalmic solution, Place 1 drop into the right eye 4 times daily Start 2 days prior to surgery four times a day, after surgery: Four times a day x 1 week  omeprazole (PRILOSEC) 20 MG DR capsule, 20 mg daily  prednisoLONE acetate (PRED FORTE) 1 % ophthalmic suspension, Place 1-2 drops into the right eye 4 times daily after surgery: Four times a day x  1 week, three times a day x 1 week, twice a day x 1 week, daily x 1 week then stop  prednisoLONE acetate (PRED FORTE) 1 % ophthalmic suspension, Place 1-2 drops into the right eye 4 times daily after surgery: Four times a day x 1 week, three times a day x 1 week, twice a day x 1 week, daily x 1 week then stop  tamsulosin (FLOMAX) 0.4 MG capsule, 0.4 mg daily    [COMPLETED] acetaminophen (TYLENOL) tablet 975 mg  lactated ringers infusion  [COMPLETED] moxifloxacin (VIGAMOX) 0.5 % ophthalmic solution 1 drop  ondansetron (ZOFRAN ODT) ODT tab 4 mg   Or  ondansetron (ZOFRAN) injection 4 mg  oxyCODONE (ROXICODONE) tablet 10 mg  oxyCODONE (ROXICODONE) tablet 5 mg  prochlorperazine (COMPAZINE) injection 5 mg  [COMPLETED] proparacaine (ALCAINE) 0.5 % ophthalmic solution 1 drop  [COMPLETED] tropicamide 1 %-cyclopentolate 1 %-phenylephrine 2.5% 1-1-2.5 % ophthalmic solution 1 drop      FHx: No known family history of glaucoma or macular degeneration  PSHx: Cataract extraction/IOL Coffey left eye 8/31/23  Cataract extraction/IOL Coffey right eye 11/02/23     Current Eye Medications: Artificial tears PRN        Assessment & Plan:  (Z96.1) Pseudophakia, left eye    Coffey left eye 8/31/23      (H25.811) Combined forms of age-related cataract of right eye   # Pseudophakia, RIGHT eye  - Uncomplicated phacoemulsification CE/IOL for emmetropic target.  - POD0, good post-operative appearance, wounds sealed. IOP reasonable.    Plan  - Keep patch on today  Tomorrow, start eye drops  - Prednisolone acetate to operative eye: 4x/day for 1 week, 3x/day for 1 week, 2x/day for 1 week, 1x/day for 1 week.   - Ketorolac to operative eye: 4x/day for 1 week, 3x/day for 1 week, 2x/day for 1 week, 1x/day for 1 week.   - Moxifloxacin 4x/day to operative eye for 7 days.    - Eye protection at all times and eye shield at night for 1 week.  - Limited activities with no exercise or heavy lifting for 1 week.  - Instructed patient to contact us for  decreasing vision, eye pain, new floaters or flashes of light or other concerning symptoms.  - Routine follow-up already scheduled on 11/08/2023    Written instructions given    (H40.003) Glaucoma suspect of both eyes  (primary encounter diagnosis)  Gonioscopy: open to CBB, heavily pigmented angle each eye   Ethnicity:   Prior surgical interventions: none    T max: 18/15  C/D: 0.75/0.75  Pachymetry: 610/614  FH of glaucoma: no known  OCT nerve fiber layer 12/07/22:   Right eye - G(y) 79 NI (g) 113 TI (g) 117 NS (y) 60 TS (g) 110      Left eye - G(y) 78 NI (g) 94TI (g) 113 NS (g) 79 TS (y) 101  OCT nerve fiber layer 06/14/23:   Right eye - G(g) 81 NI (g) 124 TI (g) 116 NS (r) 57 TS (g) 111      Left eye - G(y) 78 NI (g) 95 TI (g) 108 NS (g) 78 TS (y) 102    OVF 24-2 (2/8/23):   -right eye: sup and inf arc deficits; FN 20%   -left eye: sup arc, IT deficits; reliable  OVF 24-2 (6/14/23):   -right eye: MD -7.3, nonspecific   -left eye: MD -15.4,severely reduced    Oct retinal nerve fiber layer and intraocular pressure stable  Vision severely reduced today due to cataract  Recommend Cataract extraction/iol left eye     (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    Thank you for entrusting us with your care  Kelli Arriaga MD, PGY3  Ophthalmology Resident  [facility]      Attending Physician Attestation:  Complete documentation of historical and exam elements from today's encounter can be found in the full encounter summary report (not reduplicated in this progress note).  I personally obtained the chief complaint(s) and history of present illness.  I confirmed and  edited as necessary the review of systems, past medical/surgical history, family history, social history, and examination findings as documented by others; and I examined the patient myself.  I personally reviewed the relevant tests, images, and reports as documented above.  I formulated and edited as necessary the assessment and plan and discussed the findings and management plan with the patient and family. - Lawrence Coffey MD

## 2023-11-02 NOTE — ANESTHESIA POSTPROCEDURE EVALUATION
Patient: Kristian HILL    Procedure: Procedure(s):  RIGHT EYE PHACOEMULSIFICATION, CATARACT, WITH INTRAOCULAR LENS IMPLANT       Anesthesia Type:  MAC    Note:  Disposition: Outpatient   Postop Pain Control: Uneventful            Sign Out: Well controlled pain   PONV: No   Neuro/Psych: Uneventful            Sign Out: Acceptable/Baseline neuro status   Airway/Respiratory: Uneventful            Sign Out: Acceptable/Baseline resp. status   CV/Hemodynamics: Uneventful            Sign Out: Acceptable CV status; No obvious hypovolemia; No obvious fluid overload   Other NRE: NONE   DID A NON-ROUTINE EVENT OCCUR?            Last vitals:  Vitals Value Taken Time   /78 11/02/23 1045   Temp 36.2  C (97.2  F) 11/02/23 1045   Pulse     Resp 20 11/02/23 1045   SpO2 99 % 11/02/23 1045       Electronically Signed By: Duke Alcazar MD  November 2, 2023  10:57 AM

## 2023-11-02 NOTE — ANESTHESIA PREPROCEDURE EVALUATION
Anesthesia Pre-Procedure Evaluation    Patient: Kristian HILL   MRN: [id number] : 1955        Procedure : Procedure(s):  RIGHT EYE PHACOEMULSIFICATION, CATARACT, WITH INTRAOCULAR LENS IMPLANT          No past medical history on file.   Past Surgical History:   Procedure Laterality Date    CATARACT IOL, RT/LT      COLONOSCOPY N/A 2023    Procedure: COLONOSCOPY, FLEXIBLE, WITH LESION REMOVAL USING SNARE;  Surgeon: Darryl Fisher MD;  Location:  GI    CYSTOSCOPY      PHACOEMULSIFICATION CLEAR CORNEA WITH STANDARD INTRAOCULAR LENS IMPLANT Left 2023    Procedure: LEFT EYE PHACOEMULSIFICATION, CATARACT, WITH INTRAOCULAR LENS IMPLANT;  Surgeon: Lawrence Coffey MD;  [place] [facility] [place]      No Known Allergies   Social History     Tobacco Use    Smoking status: Some Days     Types: Cigarettes     Passive exposure: Current    Smokeless tobacco: Never   Substance Use Topics    Alcohol use: Not Currently      Wt Readings from Last 1 Encounters:   23 81.6 kg (180 lb)        Anesthesia Evaluation   Pt has had prior anesthetic.     No history of anesthetic complications       ROS/MED HX  ENT/Pulmonary: Comment: Combined forms of age-related cataract of both eyes      Neurologic:  - neg neurologic ROS     Cardiovascular:     (+)  hypertension- -   -  - -                                      METS/Exercise Tolerance: >4 METS    Hematologic:  - neg hematologic  ROS     Musculoskeletal:  - neg musculoskeletal ROS     GI/Hepatic:     (+)          cholecystitis/cholelithiasis, hepatitis type C, liver disease,       Renal/Genitourinary:  - neg Renal ROS     Endo:  - neg endo ROS     Psychiatric/Substance Use:  - neg psychiatric ROS     Infectious Disease:     (+)      TB (latent),      Malignancy:  - neg malignancy ROS     Other:  - neg other ROS          Physical Exam    Airway        Mallampati: II       Respiratory Devices and Support         Dental       (+) Modest Abnormalities -  "crowns, retainers, 1 or 2 missing teeth      Cardiovascular          Rhythm and rate: regular     Pulmonary           breath sounds clear to auscultation           OUTSIDE LABS:  CBC:   Lab Results   Component Value Date    WBC 4.3 06/19/2023    HGB 16.9 06/19/2023    HCT 49.8 06/19/2023     06/19/2023     BMP:   Lab Results   Component Value Date     01/18/2023     01/04/2023    POTASSIUM 3.8 01/18/2023    POTASSIUM 3.4 01/04/2023    CHLORIDE 103 01/18/2023    CHLORIDE 102 01/04/2023    CO2 22 01/18/2023    CO2 20 (L) 01/04/2023    BUN 11.3 01/18/2023    BUN 15.4 01/04/2023    CR 0.82 01/18/2023    CR 0.73 01/04/2023     (H) 01/18/2023    GLC 90 01/04/2023     COAGS:   Lab Results   Component Value Date    INR 1.01 01/18/2023     POC: No results found for: \"BGM\", \"HCG\", \"HCGS\"  HEPATIC:   Lab Results   Component Value Date    ALBUMIN 4.3 01/04/2023    PROTTOTAL 8.4 (H) 01/04/2023     (H) 01/04/2023    AST 66 (H) 01/04/2023    ALKPHOS 80 01/04/2023    BILITOTAL 0.7 01/04/2023     OTHER:   Lab Results   Component Value Date    FELI 9.0 01/18/2023    TSH 3.30 01/18/2023       Anesthesia Plan    ASA Status:  3    NPO Status:  NPO Appropriate    Anesthesia Type: MAC.     - Reason for MAC: immobility needed              Consents    Anesthesia Plan(s) and associated risks, benefits, and realistic alternatives discussed. Questions answered and patient/representative(s) expressed understanding.     - Discussed:     - Discussed with:  Patient            Postoperative Care            Comments:                Duke Alcazar MD  "

## 2023-11-02 NOTE — OP NOTE
PREOPERATIVE DIAGNOSIS:   1. Combined forms of age-related cataract of right eye     Right eye   POSTOPERATIVE DIAGNOSIS: Same   PROCEDURES:   1. Cataract extraction with intraocular lens implant Right eye.  SURGEON: Lawrence Coffey M.D.  ASSISTANT: Poornima Arriaga MD  INDICATIONS: The patient Kristian HILL presented to the eye clinic with decreased vision secondary to cataract in the Right eye. The risks, benefits and alternatives to cataract extraction were discussed. The patient elected to proceed. All questions were answered to the patient's satisfaction.   DESCRIPTION OF PROCEDURE: Prior to the procedure, appropriate cardiac and respiratory monitors were applied to the patient.  The patient was brought to the operating room where a surgical pause was carried out to identify with all members of the surgical team the correct surgical site.  Under monitored anesthesia care, a retrobulbar injection of 2% lidocaine with hyaluronidase was given.With adequate anesthesia and akinesia, the Right eye was prepped and draped in the usual sterile fashion. A lid speculum was placed, and the operating microscope was rotated into position. A paracentesis was created.  Through this limbal paracentesis, the anterior chamber was inflated with dispersive viscoelastic. A temporal wound was created at the limbus using a 2.6 mm blade. A capsulorrhexis was initiated using a bent 25-gauge needle and was completed in continuous and circular fashion using the capsulorrhexis forceps. The lens nucleus was hydrodissected using balanced salt solution.  The lens nucleus was rotated and removed using phacoemulsification in a divide and conquer technique.  Residual cortical material was removed using irrigation-aspiration.  The capsular bag was reinflated to its maximal extent with cohesive viscoelastic.  A 25.0 diopter [id number] inserted into the capsular bag.  The lens power selected was reviewed using the intaocular lens power measurements  that were obtained preoperatively to confirm that the correct lens was selected for the desired post-operative refractive state. The residual viscoelastic was removed in its entirety, the wound were hydrated and found to be self-sealing.  Intracameral moxifloxacin was administered. Tactile pressure was confirmed to be in a normal range. Dexamethasone were injected into the inferior fornix.  The lid speculum was removed and a patch and shield were applied.  The patient tolerated the procedure well, and there were no complications.    PLAN: The patient will be discharged to home and will follow up today  EBL:  None  Complications:  None  Implant Name Type Inv. Item Serial No.  Lot No. LRB No. Used Action   LENS [id number] 25.0 [employer] UV ASPHERIC BICONVEX IOL - [id number] Lens/Eye Implant LENS [id number] 25.0 [employer] UV ASPHERIC BICONVEX IOL [id number] [employer]  Right 1 Implanted        Attending Physician Procedure Attestation: I was present for the entire procedure and was available to assist as needed-Lawrence Coffey MD

## 2023-11-02 NOTE — ANESTHESIA CARE TRANSFER NOTE
Patient: Kristian HILL    Procedure: Procedure(s):  RIGHT EYE PHACOEMULSIFICATION, CATARACT, WITH INTRAOCULAR LENS IMPLANT       Diagnosis: Combined forms of age-related cataract of right eye [H25.811]  Diagnosis Additional Information: No value filed.    Anesthesia Type:   MAC     Note:    Oropharynx: oropharynx clear of all foreign objects and spontaneously breathing  Level of Consciousness: awake  Oxygen Supplementation: room air    Independent Airway: airway patency satisfactory and stable  Dentition: dentition unchanged  Vital Signs Stable: post-procedure vital signs reviewed and stable  Report to RN Given: handoff report given  Patient transferred to: Phase II    Handoff Report: Identifed the Patient, Identified the Reponsible Provider, Reviewed the pertinent medical history, Discussed the surgical course, Reviewed Intra-OP anesthesia mangement and issues during anesthesia, Set expectations for post-procedure period and Allowed opportunity for questions and acknowledgement of understanding    See post op vitals  Vitals:  Vitals Value Taken Time   BP     Temp     Pulse     Resp     SpO2         Electronically Signed By: CHING Broderick CRNA  November 2, 2023  10:48 AM

## 2023-11-02 NOTE — H&P
Pre-Operative H & P     CC:  Preoperative exam to assess for increased cardiopulmonary risk while undergoing surgery and anesthesia.    Date of Encounter: 11/2/2023  Primary Care Physician:  No Ref-Primary, Physician     Reason for visit: Cataract surgery    HPI  Kristian HILL is a 68 year old male who presents for pre-operative H & P in preparation for CEIOL with Dr. Coffey on 11/02/23  at [facility].     History is obtained from the patient and chart review      Hx of abnormal bleeding or anti-platelet use: None      Prior to Admission Medications  Current Outpatient Medications   Medication Sig Dispense Refill    ketorolac (ACULAR) 0.5 % ophthalmic solution Place 1 drop into the right eye 4 times daily Start 2 days prior to surgery four times a day, after surgery: Four times a day x 1 week, three times a day x 1 week, twice a day x 1 week, daily x 1 week then stop 10 mL 0    moxifloxacin (VIGAMOX) 0.5 % ophthalmic solution Place 1 drop into the right eye 4 times daily Start 2 days prior to surgery four times a day, after surgery: Four times a day x 1 week 3 mL 0    prednisoLONE acetate (PRED FORTE) 1 % ophthalmic suspension Place 1-2 drops into the right eye 4 times daily after surgery: Four times a day x 1 week, three times a day x 1 week, twice a day x 1 week, daily x 1 week then stop 10 mL 0    ARTIFICIAL TEARS 1 % ophthalmic solution APPLY 1 DROP TO EYE 4 (FOUR) TIMES DAILY AS NEEDED (FOR DRY EYES) FOR UP TO 90 DAYS      fluocinolone acetonide (DERMA SMOOTHE/FS BODY) 0.01 % external oil Use once every other day for scalp itching. After one month, use once weekly. 118.28 mL 1    ketoconazole (NIZORAL) 2 % external shampoo Scrub into scalp 2-3 times a week for flaking and itching. After application, wait for five minutes before rinsing thoroughly. 120 mL 11    lisinopril-hydrochlorothiazide (ZESTORETIC) 10-12.5 MG tablet Take 1 tablet by mouth every morning      omeprazole  "(PRILOSEC) 20 MG DR capsule 20 mg daily      tamsulosin (FLOMAX) 0.4 MG capsule 0.4 mg daily         Family History  Family History   Problem Relation Age of Onset    Anesthesia Reaction No family hx of     Deep Vein Thrombosis (DVT) No family hx of     Glaucoma No family hx of     Macular Degeneration No family hx of        The complete review of systems is negative other than noted in the HPI or here.     Preprocedure Note            No Anesthesia note exists            /64   Temp 97.7  F (36.5  C) (Temporal)   Resp 16   Ht 1.6 m (5' 3\")   Wt 81.6 kg (180 lb)   SpO2 98%   BMI 31.89 kg/m           Physical Exam  Constitutional: Awake, alert, cooperative, no apparent distress, and appears stated age.  Eyes: Pupils equal, round and reactive to light, extra ocular muscles intact, sclera clear, conjunctiva normal.  HENT: Normocephalic, oral pharynx with moist mucus membranes, good dentition. No goiter appreciated.   Respiratory: Clear to auscultation bilaterally, no crackles or wheezing.  Cardiovascular: Regular rate and rhythm, normal S1 and S2, and no murmur noted.  Carotids +2, no bruits. No edema. Palpable pulses to radial  DP and PT arteries.   GI: Normal bowel sounds, soft, non-distended, non-tender, no masses palpated, no hepatosplenomegaly.  Surgical scars:   Lymph/Hematologic: No cervical lymphadenopathy and no supraclavicular lymphadenopathy.  Skin: Warm and dry.  No rashes at anticipated surgical site.   Musculoskeletal: Full ROM of neck. There is no redness, warmth, or swelling of the joints. Gross motor strength is normal.    Neurologic: Awake, alert, oriented to name, place and time. Cranial nerves II-XII are grossly intact. Gait is normal.   Neuropsychiatric: Calm, cooperative. Normal affect.     PRIOR LABS/DIAGNOSTIC STUDIES:   All labs and imaging personally reviewed     EKG/ stress test - if available please see in ROS above   No results found.       No data to display                  The " patient's records and results personally reviewed by this provider.     Outside records reviewed from: care everywhere    LAB/DIAGNOSTIC STUDIES TODAY:  None    ASSESSMENT and PLAN    Plan/Recommendations  Pt will be optimized for the proposed procedure.  See below for details on the assessment, risk, and preoperative recommendations      NEUROLOGY  - No h/o CVA, TIA  - Post Op delirium risk factors:  Age     ENT  - No current airway concerns.  Will need to be reassessed day of surgery.  Mallampati: Anesthesia will assess  TM: Anesthesia will assess     CARDIAC  - No h/o CAD/NSTEMI, CHF  - Does not have HLD and has not been on low-dose statin by choice     PULMONARY  Tired  Observed apnea  HTN  BMI >30  Age >50  Male        GI  Denies GERD          /RENAL  - No CKD     ENDOCRINE    Body mass index is 31.89 kg/m .  - No history of Diabetes Mellitus or thyroid abnormalities     HEME  - No history of abnormal bleeding or antiplatelet use or anticoagulant use.         MSK  - No concern      Thank you for entrusting us with your care  Kelli Arriaga MD, PGY3  Ophthalmology Resident  [facility]

## 2023-11-02 NOTE — DISCHARGE INSTRUCTIONS
[facility]  Home Care Following Anesthesia  For 24 hours after surgery:  Get plenty of rest.  A responsible adult must stay with you for at least 24 hours after you leave the surgery center.  Do not drive or use heavy equipment.  If you have weakness or tingling, don't drive or use heavy equipment until this feeling goes away.   Do not drink alcohol.   Avoid strenuous or risky activities.  Ask for help when climbing stairs.  You may feel lightheaded.  IF so, sit for a few minutes before standing.  Have someone help you get up.   If you have nausea (feel sick to your stomach): Drink only clear liquids such as apple juice, ginger ale, broth or 7-Up.  Rest may also help.  Be sure to drink enough fluids.  Move to a regular diet as you feel able.   You may have a slight fever.  Call the doctor if your fever is over 100 F (37.7 C) (taken under the tongue) or lasts longer than 24 hours.  You may have a dry mouth, a sore throat, muscle aches or trouble sleeping. These should go away after 24 hours.  Do not make important or legal decisions.   It is recommended to avoid smoking.               Tips for taking pain medications  To get the best pain relief possible, remember these points:  Take pain medications as directed, before pain becomes severe.  Pain medication can upset your stomach: taking it with food may help.  Constipation is a common side effect of pain medication. Drink plenty of  fluids.  Eat foods high in fiber. Take a stool softener if recommended by your doctor or pharmacist.  Do not drink alcohol, drive or operate machinery while taking pain medications.  Ask about other ways to control pain, such as with heat, ice or relaxation.    Tylenol/Acetaminophen Consumption    If you feel your pain relief is insufficient, you may take Tylenol/Acetaminophen in addition to your narcotic pain medication.   Be careful not to exceed 4,000 mg of Tylenol/Acetaminophen in a 24 hour  period from all sources.  If you are taking extra strength Tylenol/acetaminophen (500 mg), the maximum dose is 8 tablets in 24 hours.  If you are taking regular strength acetaminophen (325 mg), the maximum dose is 12 tablets in 24 hours.  You last took Tylenol 975mg at 9:15am. Next available dose is at 3:15pm, then follow bottle instructions.     Call a doctor for any of the following:  Signs of infection (fever, growing tenderness at the surgery site, a large amount of drainage or bleeding, severe pain, foul-smelling drainage, redness, swelling).  It has been over 8 to 10 hours since surgery and you are still not able to urinate (pass water).  Headache for over 24 hours.  Numbness, tingling or weakness the day after surgery (if you had spinal anesthesia).  Signs of Covid-19 infection (temperature over 100 degrees, shortness of breath, cough, loss of taste/smell, generalized body aches, persistent headache, chills, sore throat, nausea/vomiting/diarrhea)  Your doctor is:  Dr. Lawrence Coffey, Ophthalmology: [phone]                    Or dial [phone] and ask for the resident on call for:  Ophthalmology  For emergency care, call the:  [facility]:  [phone] (TTY for hearing impaired: [phone])

## 2023-11-08 NOTE — NURSING NOTE
Chief Complaints and History of Present Illnesses   Patient presents with    Post Op (Ophthalmology) Both Eyes     Cataract extraction with IOL in the right eye on 11/02/2023   Cataract extraction with IOL in the left eye on 08/31/2023     Chief Complaint(s) and History of Present Illness(es)       Post Op (Ophthalmology) Both Eyes              Laterality: both eyes    Course: gradually improving    Associated symptoms: floaters.  Negative for dryness, eye pain, redness and discharge    Treatments tried: eye drops    Pain scale: 0/10    Comments: Cataract extraction with IOL in the right eye on 11/02/2023   Cataract extraction with IOL in the left eye on 08/31/2023              Comments    Patient returns for a 6 day post operative right eye exam and a 10 week post operative left eye exam.  He states that he was not able to get eye drops -he went to the pharmacy twice and they did not have his script ([facility] [address]  He has been using Ketorolac and Prednisolone acetate, which he had left over from his left eye.    His vision has been improving in the right eye.  He is not having any eye discomfort except when using the drop-which irritate for a minute after use.    Suzi Lawrence, COT 1:45 PM  November 8, 2023

## 2023-11-08 NOTE — PROGRESS NOTES
HPI       Post Op (Ophthalmology) Both Eyes    In both eyes.  Since onset it is gradually improving.  Associated symptoms include floaters.  Negative for dryness, eye pain, redness and discharge.  Treatments tried include eye drops.  Pain was noted as 0/10. Additional comments: Cataract extraction with IOL in the right eye on 11/02/2023   Cataract extraction with IOL in the left eye on 08/31/2023             Comments    Patient returns for a 6 day post operative right eye exam and a 10 week post operative left eye exam.  He states that he was not able to get eye drops -he went to the pharmacy twice and they did not have his script ([facility] [address]  He has been using Ketorolac and Prednisolone acetate, which he had left over from his left eye.    His vision has been improving in the right eye.  He is not having any eye discomfort except when using the drop-which irritate for a minute after use.    TITO Posada 1:45 PM  November 8, 2023               Last edited by Suzi Lawrence COT on 11/8/2023  1:45 PM.         Review of systems for the eyes was negative other than the pertinent positives/negatives listed in the HPI.      Assessment & Plan    HPI:  Kristian HILL is a 68 year old male who presents for POW4 Cataract extraction/intraocular lens left eye. Now desires surgery right eye due to color difference and decreased vision right eye, glare, and haloes      POHx: bifocal use  PMHx:   History reviewed. No pertinent past medical history.    Current Medications: ketorolac (ACULAR) 0.5 % ophthalmic solution, Place 1 drop into the right eye 4 times daily Start 2 days prior to surgery four times a day, after surgery: Four times a day x 1 week, three times a day x 1 week, twice a day x 1 week, daily x 1 week then stop  lisinopril-hydrochlorothiazide (ZESTORETIC) 10-12.5 MG tablet, Take 1 tablet by mouth every morning  prednisoLONE acetate (PRED FORTE) 1 % ophthalmic suspension, Place 1-2 drops into the  right eye 4 times daily after surgery: Four times a day x 1 week, three times a day x 1 week, twice a day x 1 week, daily x 1 week then stop  ARTIFICIAL TEARS 1 % ophthalmic solution, APPLY 1 DROP TO EYE 4 (FOUR) TIMES DAILY AS NEEDED (FOR DRY EYES) FOR UP TO 90 DAYS  fluocinolone acetonide (DERMA SMOOTHE/FS BODY) 0.01 % external oil, Use once every other day for scalp itching. After one month, use once weekly.  ketoconazole (NIZORAL) 2 % external shampoo, Scrub into scalp 2-3 times a week for flaking and itching. After application, wait for five minutes before rinsing thoroughly.  ketorolac (ACULAR) 0.5 % ophthalmic solution, Place 1 drop into the right eye 4 times daily Start 2 days prior to surgery four times a day, after surgery: Four times a day x 1 week, three times a day x 1 week, twice a day x 1 week, daily x 1 week then stop  moxifloxacin (VIGAMOX) 0.5 % ophthalmic solution, Place 1 drop into the right eye 4 times daily Start 2 days prior to surgery four times a day, after surgery: Four times a day x 1 week  moxifloxacin (VIGAMOX) 0.5 % ophthalmic solution, Place 1 drop into the right eye 4 times daily Start 2 days prior to surgery four times a day, after surgery: Four times a day x 1 week  omeprazole (PRILOSEC) 20 MG DR capsule, 20 mg daily  prednisoLONE acetate (PRED FORTE) 1 % ophthalmic suspension, Place 1-2 drops into the right eye 4 times daily after surgery: Four times a day x 1 week, three times a day x 1 week, twice a day x 1 week, daily x 1 week then stop  tamsulosin (FLOMAX) 0.4 MG capsule, 0.4 mg daily    No current facility-administered medications on file prior to visit.    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  (primary encounter diagnosis)  Gonioscopy: open to CBB, heavily pigmented angle each eye   Ethnicity:   Prior surgical interventions: none    T max: 18/15  C/D: 0.75/0.75  Pachymetry: 610/614  FH of glaucoma: no known  OCT nerve fiber layer 12/07/22:   Right eye - G(y) 79 NI (g) 113 TI (g) 117 NS (y) 60 TS (g) 110      Left eye - G(y) 78 NI (g) 94TI (g) 113 NS (g) 79 TS (y) 101  OCT nerve fiber layer 06/14/23:   Right eye - G(g) 81 NI (g) 124 TI (g) 116 NS (r) 57 TS (g) 111      Left eye - G(y) 78 NI (g) 95 TI (g) 108 NS (g) 78 TS (y) 102    OVF 24-2 (2/8/23):   -right eye: sup and inf arc deficits; FN 20%   -left eye: sup arc, IT deficits; reliable  OVF 24-2 (6/14/23):   -right eye: MD -7.3, nonspecific   -left eye: MD -15.4,severely reduced    Oct retinal nerve fiber layer and intraocular pressure stable      (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      Attending Physician Attestation:  Complete documentation of historical and exam elements from today's encounter can be found in the full encounter summary report (not reduplicated in this progress note).  I personally obtained the chief complaint(s) and history of present illness.  I confirmed and edited as necessary the review of systems, past medical/surgical history, family history, social history, and examination findings as documented by others; and I examined the patient myself.  I personally reviewed the relevant tests, images, and reports as documented above.  I formulated and edited as necessary the assessment and plan and discussed the findings and  management plan with the patient and family. - Lawrence Coffey MD

## (undated) DEVICE — EYE TIP IRRIGATION & ASPIRATION POLYMER 35D BENT 8065751511

## (undated) DEVICE — EYE PACK CUSTOM ANTERIOR 30DEG TIP CENTURION PPK6682-04

## (undated) DEVICE — SOL WATER IRRIG 500ML BOTTLE 2F7113

## (undated) DEVICE — PACK CATARACT CUSTOM ASC SEY15CPUMC

## (undated) DEVICE — EYE KNIFE SLIT XSTAR VISITEC 2.6MM 45DEG 373726

## (undated) DEVICE — EYE CANN IRR 27GA ANTERIOR CHAMBER 581280

## (undated) DEVICE — EYE CANN IRR 25GA CYSTOTOME 581610

## (undated) DEVICE — LINEN TOWEL PACK X5 5464

## (undated) DEVICE — EYE KNIFE STILETTO VISITEC 1.1MM ANG 45DEG SIDEPORT 376620

## (undated) DEVICE — GLOVE BIOGEL PI MICRO SZ 7.5 48575

## (undated) DEVICE — EYE SHIELD PLASTIC

## (undated) RX ORDER — PROPOFOL 10 MG/ML
INJECTION, EMULSION INTRAVENOUS
Status: DISPENSED
Start: 2023-01-01

## (undated) RX ORDER — ACETAMINOPHEN 325 MG/1
TABLET ORAL
Status: DISPENSED
Start: 2023-01-01

## (undated) RX ORDER — FENTANYL CITRATE 50 UG/ML
INJECTION, SOLUTION INTRAMUSCULAR; INTRAVENOUS
Status: DISPENSED
Start: 2023-01-01